# Patient Record
Sex: FEMALE | Race: OTHER | NOT HISPANIC OR LATINO | URBAN - METROPOLITAN AREA
[De-identification: names, ages, dates, MRNs, and addresses within clinical notes are randomized per-mention and may not be internally consistent; named-entity substitution may affect disease eponyms.]

---

## 2018-05-04 ENCOUNTER — EMERGENCY (EMERGENCY)
Facility: HOSPITAL | Age: 48
LOS: 1 days | Discharge: HOME | End: 2018-05-04
Attending: EMERGENCY MEDICINE
Payer: MEDICAID

## 2018-05-04 VITALS
HEIGHT: 66 IN | RESPIRATION RATE: 16 BRPM | SYSTOLIC BLOOD PRESSURE: 107 MMHG | HEART RATE: 101 BPM | TEMPERATURE: 99 F | WEIGHT: 136.03 LBS | DIASTOLIC BLOOD PRESSURE: 70 MMHG | OXYGEN SATURATION: 99 %

## 2018-05-04 VITALS
SYSTOLIC BLOOD PRESSURE: 104 MMHG | RESPIRATION RATE: 16 BRPM | OXYGEN SATURATION: 100 % | DIASTOLIC BLOOD PRESSURE: 52 MMHG | TEMPERATURE: 98 F | HEART RATE: 89 BPM

## 2018-05-04 DIAGNOSIS — Z98.51 TUBAL LIGATION STATUS: Chronic | ICD-10-CM

## 2018-05-04 LAB
ALBUMIN SERPL ELPH-MCNC: 3.4 G/DL — LOW (ref 3.5–5)
ALP SERPL-CCNC: 101 U/L — SIGNIFICANT CHANGE UP (ref 40–120)
ALT FLD-CCNC: 120 U/L DA — HIGH (ref 10–60)
ANION GAP SERPL CALC-SCNC: 6 MMOL/L — SIGNIFICANT CHANGE UP (ref 5–17)
AST SERPL-CCNC: 96 U/L — HIGH (ref 10–40)
BASOPHILS # BLD AUTO: 0.1 K/UL — SIGNIFICANT CHANGE UP (ref 0–0.2)
BASOPHILS NFR BLD AUTO: 0.9 % — SIGNIFICANT CHANGE UP (ref 0–2)
BILIRUB SERPL-MCNC: 0.4 MG/DL — SIGNIFICANT CHANGE UP (ref 0.2–1.2)
BUN SERPL-MCNC: 10 MG/DL — SIGNIFICANT CHANGE UP (ref 7–18)
CALCIUM SERPL-MCNC: 8.6 MG/DL — SIGNIFICANT CHANGE UP (ref 8.4–10.5)
CHLORIDE SERPL-SCNC: 107 MMOL/L — SIGNIFICANT CHANGE UP (ref 96–108)
CO2 SERPL-SCNC: 26 MMOL/L — SIGNIFICANT CHANGE UP (ref 22–31)
CREAT SERPL-MCNC: 0.73 MG/DL — SIGNIFICANT CHANGE UP (ref 0.5–1.3)
EOSINOPHIL # BLD AUTO: 0 K/UL — SIGNIFICANT CHANGE UP (ref 0–0.5)
EOSINOPHIL NFR BLD AUTO: 0.2 % — SIGNIFICANT CHANGE UP (ref 0–6)
GLUCOSE SERPL-MCNC: 89 MG/DL — SIGNIFICANT CHANGE UP (ref 70–99)
HCT VFR BLD CALC: 36.3 % — SIGNIFICANT CHANGE UP (ref 34.5–45)
HGB BLD-MCNC: 11.5 G/DL — SIGNIFICANT CHANGE UP (ref 11.5–15.5)
LACTATE SERPL-SCNC: 0.7 MMOL/L — SIGNIFICANT CHANGE UP (ref 0.7–2)
LYMPHOCYTES # BLD AUTO: 0.7 K/UL — LOW (ref 1–3.3)
LYMPHOCYTES # BLD AUTO: 9.7 % — LOW (ref 13–44)
MCHC RBC-ENTMCNC: 27.2 PG — SIGNIFICANT CHANGE UP (ref 27–34)
MCHC RBC-ENTMCNC: 31.7 GM/DL — LOW (ref 32–36)
MCV RBC AUTO: 85.6 FL — SIGNIFICANT CHANGE UP (ref 80–100)
MONOCYTES # BLD AUTO: 0.7 K/UL — SIGNIFICANT CHANGE UP (ref 0–0.9)
MONOCYTES NFR BLD AUTO: 9.6 % — SIGNIFICANT CHANGE UP (ref 2–14)
NEUTROPHILS # BLD AUTO: 6.1 K/UL — SIGNIFICANT CHANGE UP (ref 1.8–7.4)
NEUTROPHILS NFR BLD AUTO: 79.6 % — HIGH (ref 43–77)
PLATELET # BLD AUTO: 154 K/UL — SIGNIFICANT CHANGE UP (ref 150–400)
POTASSIUM SERPL-MCNC: 3.9 MMOL/L — SIGNIFICANT CHANGE UP (ref 3.5–5.3)
POTASSIUM SERPL-SCNC: 3.9 MMOL/L — SIGNIFICANT CHANGE UP (ref 3.5–5.3)
PROT SERPL-MCNC: 7.2 G/DL — SIGNIFICANT CHANGE UP (ref 6–8.3)
RBC # BLD: 4.24 M/UL — SIGNIFICANT CHANGE UP (ref 3.8–5.2)
RBC # FLD: 12.6 % — SIGNIFICANT CHANGE UP (ref 10.3–14.5)
SODIUM SERPL-SCNC: 139 MMOL/L — SIGNIFICANT CHANGE UP (ref 135–145)
WBC # BLD: 7.6 K/UL — SIGNIFICANT CHANGE UP (ref 3.8–10.5)
WBC # FLD AUTO: 7.6 K/UL — SIGNIFICANT CHANGE UP (ref 3.8–10.5)

## 2018-05-04 PROCEDURE — 71045 X-RAY EXAM CHEST 1 VIEW: CPT | Mod: 26

## 2018-05-04 PROCEDURE — 70450 CT HEAD/BRAIN W/O DYE: CPT | Mod: 26

## 2018-05-04 PROCEDURE — 70491 CT SOFT TISSUE NECK W/DYE: CPT | Mod: 26

## 2018-05-04 PROCEDURE — 99285 EMERGENCY DEPT VISIT HI MDM: CPT | Mod: 25

## 2018-05-04 RX ORDER — SODIUM CHLORIDE 9 MG/ML
1000 INJECTION INTRAMUSCULAR; INTRAVENOUS; SUBCUTANEOUS ONCE
Qty: 0 | Refills: 0 | Status: COMPLETED | OUTPATIENT
Start: 2018-05-04 | End: 2018-05-04

## 2018-05-04 RX ORDER — KETOROLAC TROMETHAMINE 30 MG/ML
30 SYRINGE (ML) INJECTION ONCE
Qty: 0 | Refills: 0 | Status: DISCONTINUED | OUTPATIENT
Start: 2018-05-04 | End: 2018-05-04

## 2018-05-04 RX ORDER — DIPHENHYDRAMINE HCL 50 MG
25 CAPSULE ORAL ONCE
Qty: 0 | Refills: 0 | Status: COMPLETED | OUTPATIENT
Start: 2018-05-04 | End: 2018-05-04

## 2018-05-04 RX ORDER — DEXAMETHASONE 0.5 MG/5ML
5 ELIXIR ORAL ONCE
Qty: 0 | Refills: 0 | Status: COMPLETED | OUTPATIENT
Start: 2018-05-04 | End: 2018-05-04

## 2018-05-04 RX ORDER — METOCLOPRAMIDE HCL 10 MG
10 TABLET ORAL ONCE
Qty: 0 | Refills: 0 | Status: COMPLETED | OUTPATIENT
Start: 2018-05-04 | End: 2018-05-04

## 2018-05-04 RX ADMIN — Medication 5 MILLIGRAM(S): at 21:26

## 2018-05-04 RX ADMIN — Medication 10 MILLIGRAM(S): at 21:26

## 2018-05-04 RX ADMIN — Medication 30 MILLIGRAM(S): at 23:00

## 2018-05-04 RX ADMIN — SODIUM CHLORIDE 1000 MILLILITER(S): 9 INJECTION INTRAMUSCULAR; INTRAVENOUS; SUBCUTANEOUS at 20:56

## 2018-05-04 RX ADMIN — Medication 25 MILLIGRAM(S): at 21:25

## 2018-05-04 NOTE — ED ADULT NURSE REASSESSMENT NOTE - NS ED NURSE REASSESS COMMENT FT1
0269 - pt completed ct scan , looks fairly comfortable no signs of any distress , awaiting for dispo endorse pt to [facility].

## 2018-05-04 NOTE — ED PROVIDER NOTE - PHYSICAL EXAMINATION
b/l submandibular lymphadenopathy  b/l tonsillar edema and erythema  no uvular deviation b/l submandibular lymphadenopathy  b/l tonsillar edema and erythema wihtout exudate, no uvular deviation, no "hot potato" voice

## 2018-05-04 NOTE — ED ADULT NURSE NOTE - OBJECTIVE STATEMENT
pt is a 48 y/o female with multiple complains , states she has headache,cough/bodyaches/ sorethroat ,vomiting,allergies fever and chills . pt alert oriented x 3 with no signs of any distress

## 2018-05-04 NOTE — ED PROVIDER NOTE - OBJECTIVE STATEMENT
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 Pt describes her migraine as the "worst migraine ever" and describes her body sores as if "someone is beating her," and describes her chest discomfort as constant, sharp, pinching that does not change with touch. Pt reports that she feels a sharp pain near her rib cage as well as in her back. Pt also states that she cannot swallow and feels chills as well as feels hot. Pt notes that when she coughs, she feels like her head is throbbing. Pt relates that she cannot bring her chin to her chest and that light bothers her. Pt states that she took Advil (last taken x2.5 hours ago and has been taking every 6 hours) and Zofran, but with no relief. Pt denies sick contacts. Pt denies any other complaints. NKDA. 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 Pt describes her migraine as the "worst migraine ever" and describes her body sores as if "someone is beating her," and describes her chest discomfort as constant, sharp, pinching that does not change with touch. Pt reports that she feels a sharp pain near her rib cage as well as in her back. Pt also states that she cannot swallow and feels chills as well as feels hot. Pt notes that when she coughs, she feels like her head is throbbing. Pt relates that she cannot bring her chin to her chest and that light bothers her. Pt states that she took Advil (last taken x2.5 hours ago and has been taking every 6 hours) and Zofran, but with no relief. Pt denies sick contacts. Pt denies any other complaints. Allergies: penicillin. 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 Pt describes her migraine as "worse than usual" and describes her body aches as if "someone is beating her," and describes her chest discomfort as constant, sharp, pinching that does not change with touch. Pt reports that she feels a sharp pain near her rib cage as well as in her back. Pt also states that she cannot swallow and feels chills as well as feels hot. Pt notes that when she coughs, she feels like her head is throbbing. Pt relates that she cannot bring her chin to her chest and that light bothers her. Pt states that she took Advil (last taken x2.5 hours ago and has been taking every 6 hours) and Zofran, but with no relief. Pt denies sick contacts. Pt denies any other complaints. Allergies: penicillin.

## 2018-05-04 NOTE — ED PROVIDER NOTE - CARDIAC, MLM
Normal rate, regular rhythm.  Heart sounds S1, S2.  No murmurs, rubs or gallops. FAST rate, regular rhythm.  Heart sounds S1, S2.  No murmurs, rubs or gallops.

## 2018-05-04 NOTE — ED PROVIDER NOTE - CHPI ED SYMPTOMS POS
COUGH/sore throat, vomiting, fever, chills, nausea, migraine HA, chest pain, body sores, photophobia

## 2018-05-04 NOTE — ED PROVIDER NOTE - THROAT FINDINGS
NO VESICLES/ULCERS/NO DROOLING/NO TONGUE ELEVATION/NO STRIDOR/no exudate/uvula midline/TONSILLAR SWELLING/THROAT RED

## 2018-05-04 NOTE — ED PROVIDER NOTE - NS ED ROS FT
CONSTITUTIONAL: fever, chills  THROAT: sore throat   NEURO: migraine HAs  GASTROINTESTINAL: nausea, vomiting   MUSCULOSKELETAL: body sores   RESPIRATORY: cough  EYES: photophobia   CARDIAC: chest pain CONSTITUTIONAL: fever, chills  THROAT: sore throat, difficulty swallowing   NEURO: migraine HAs  GASTROINTESTINAL: nausea, vomiting   MUSCULOSKELETAL: myalgias, arthralgias  RESPIRATORY: cough, pleuritic chest pain  EYES: photophobia   CARDIAC: chest wall pain

## 2018-05-04 NOTE — ED PROVIDER NOTE - MEDICAL DECISION MAKING DETAILS
48 yo fem with fever, chills, myalgias, arthralgias, pharyngitis with lymphadenopathy and difficulty swallowing, along with cough and chest wall pain. pt also with migraine headache. will eval labs, cxr, CT head and soft tissue neck to evaluate for etiology of fever and discomfort, differentials include viral illness, pharyngitis, PTA/RPA, migraine, and pna.

## 2018-05-04 NOTE — ED PROVIDER NOTE - ATTENDING CONTRIBUTION TO CARE
47 year old female c/o sore throat and malaise x few days. PE: NAD, HEENT WNL, lungs clear. I&P: viral syndrome

## 2018-05-05 PROCEDURE — 87040 BLOOD CULTURE FOR BACTERIA: CPT

## 2018-05-05 PROCEDURE — 71045 X-RAY EXAM CHEST 1 VIEW: CPT

## 2018-05-05 PROCEDURE — 84702 CHORIONIC GONADOTROPIN TEST: CPT

## 2018-05-05 PROCEDURE — 96374 THER/PROPH/DIAG INJ IV PUSH: CPT | Mod: XU

## 2018-05-05 PROCEDURE — 70491 CT SOFT TISSUE NECK W/DYE: CPT

## 2018-05-05 PROCEDURE — 99284 EMERGENCY DEPT VISIT MOD MDM: CPT | Mod: 25

## 2018-05-05 PROCEDURE — 96375 TX/PRO/DX INJ NEW DRUG ADDON: CPT

## 2018-05-05 PROCEDURE — 83605 ASSAY OF LACTIC ACID: CPT

## 2018-05-05 PROCEDURE — 85027 COMPLETE CBC AUTOMATED: CPT

## 2018-05-05 PROCEDURE — 80053 COMPREHEN METABOLIC PANEL: CPT

## 2018-05-05 PROCEDURE — 70450 CT HEAD/BRAIN W/O DYE: CPT

## 2018-05-05 RX ORDER — IBUPROFEN 200 MG
1 TABLET ORAL
Qty: 30 | Refills: 0
Start: 2018-05-05 | End: 2018-05-14

## 2018-05-05 RX ORDER — AZITHROMYCIN 500 MG/1
500 TABLET, FILM COATED ORAL ONCE
Qty: 0 | Refills: 0 | Status: COMPLETED | OUTPATIENT
Start: 2018-05-05 | End: 2018-05-05

## 2018-05-05 RX ORDER — CLARITHROMYCIN 500 MG
1 TABLET ORAL
Qty: 4 | Refills: 0
Start: 2018-05-05 | End: 2018-05-08

## 2018-05-05 RX ADMIN — Medication 2 TABLET(S): at 01:20

## 2018-05-05 RX ADMIN — Medication 2 TABLET(S): at 00:31

## 2018-05-05 RX ADMIN — Medication 30 MILLIGRAM(S): at 01:20

## 2018-05-05 RX ADMIN — AZITHROMYCIN 500 MILLIGRAM(S): 500 TABLET, FILM COATED ORAL at 01:28

## 2018-05-10 LAB
CULTURE RESULTS: SIGNIFICANT CHANGE UP
CULTURE RESULTS: SIGNIFICANT CHANGE UP
SPECIMEN SOURCE: SIGNIFICANT CHANGE UP
SPECIMEN SOURCE: SIGNIFICANT CHANGE UP

## 2020-01-23 ENCOUNTER — EMERGENCY (EMERGENCY)
Facility: HOSPITAL | Age: 50
LOS: 1 days | Discharge: HOME | End: 2020-01-23
Attending: EMERGENCY MEDICINE
Payer: MEDICAID

## 2020-01-23 VITALS
SYSTOLIC BLOOD PRESSURE: 141 MMHG | HEART RATE: 122 BPM | TEMPERATURE: 98 F | OXYGEN SATURATION: 100 % | HEIGHT: 66 IN | WEIGHT: 138.01 LBS | DIASTOLIC BLOOD PRESSURE: 90 MMHG | RESPIRATION RATE: 20 BRPM

## 2020-01-23 DIAGNOSIS — Z98.51 TUBAL LIGATION STATUS: Chronic | ICD-10-CM

## 2020-01-23 PROCEDURE — 99284 EMERGENCY DEPT VISIT MOD MDM: CPT

## 2020-01-24 VITALS
SYSTOLIC BLOOD PRESSURE: 110 MMHG | RESPIRATION RATE: 18 BRPM | TEMPERATURE: 98 F | OXYGEN SATURATION: 99 % | HEART RATE: 65 BPM | DIASTOLIC BLOOD PRESSURE: 68 MMHG

## 2020-01-24 LAB
ALBUMIN SERPL ELPH-MCNC: 4.4 G/DL — SIGNIFICANT CHANGE UP (ref 3.3–5)
ALP SERPL-CCNC: 108 U/L — SIGNIFICANT CHANGE UP (ref 40–120)
ALT FLD-CCNC: 34 U/L — SIGNIFICANT CHANGE UP (ref 10–45)
ANION GAP SERPL CALC-SCNC: 12 MMOL/L — SIGNIFICANT CHANGE UP (ref 5–17)
AST SERPL-CCNC: 25 U/L — SIGNIFICANT CHANGE UP (ref 10–40)
BASOPHILS # BLD AUTO: 0.04 K/UL — SIGNIFICANT CHANGE UP (ref 0–0.2)
BASOPHILS NFR BLD AUTO: 0.9 % — SIGNIFICANT CHANGE UP (ref 0–2)
BILIRUB SERPL-MCNC: 0.4 MG/DL — SIGNIFICANT CHANGE UP (ref 0.2–1.2)
BUN SERPL-MCNC: 10 MG/DL — SIGNIFICANT CHANGE UP (ref 7–23)
CALCIUM SERPL-MCNC: 9.9 MG/DL — SIGNIFICANT CHANGE UP (ref 8.4–10.5)
CHLORIDE SERPL-SCNC: 100 MMOL/L — SIGNIFICANT CHANGE UP (ref 96–108)
CO2 SERPL-SCNC: 25 MMOL/L — SIGNIFICANT CHANGE UP (ref 22–31)
CREAT SERPL-MCNC: 0.79 MG/DL — SIGNIFICANT CHANGE UP (ref 0.5–1.3)
EOSINOPHIL # BLD AUTO: 0.02 K/UL — SIGNIFICANT CHANGE UP (ref 0–0.5)
EOSINOPHIL NFR BLD AUTO: 0.5 % — SIGNIFICANT CHANGE UP (ref 0–6)
GLUCOSE SERPL-MCNC: 102 MG/DL — HIGH (ref 70–99)
HCG SERPL-ACNC: <2 MIU/ML — SIGNIFICANT CHANGE UP
HCT VFR BLD CALC: 38.9 % — SIGNIFICANT CHANGE UP (ref 34.5–45)
HGB BLD-MCNC: 12.3 G/DL — SIGNIFICANT CHANGE UP (ref 11.5–15.5)
IMM GRANULOCYTES NFR BLD AUTO: 0.5 % — SIGNIFICANT CHANGE UP (ref 0–1.5)
LYMPHOCYTES # BLD AUTO: 1.37 K/UL — SIGNIFICANT CHANGE UP (ref 1–3.3)
LYMPHOCYTES # BLD AUTO: 31.1 % — SIGNIFICANT CHANGE UP (ref 13–44)
MCHC RBC-ENTMCNC: 26.9 PG — LOW (ref 27–34)
MCHC RBC-ENTMCNC: 31.6 GM/DL — LOW (ref 32–36)
MCV RBC AUTO: 84.9 FL — SIGNIFICANT CHANGE UP (ref 80–100)
MONOCYTES # BLD AUTO: 0.41 K/UL — SIGNIFICANT CHANGE UP (ref 0–0.9)
MONOCYTES NFR BLD AUTO: 9.3 % — SIGNIFICANT CHANGE UP (ref 2–14)
NEUTROPHILS # BLD AUTO: 2.54 K/UL — SIGNIFICANT CHANGE UP (ref 1.8–7.4)
NEUTROPHILS NFR BLD AUTO: 57.7 % — SIGNIFICANT CHANGE UP (ref 43–77)
NRBC # BLD: 0 /100 WBCS — SIGNIFICANT CHANGE UP (ref 0–0)
PLATELET # BLD AUTO: 214 K/UL — SIGNIFICANT CHANGE UP (ref 150–400)
POTASSIUM SERPL-MCNC: 3.9 MMOL/L — SIGNIFICANT CHANGE UP (ref 3.5–5.3)
POTASSIUM SERPL-SCNC: 3.9 MMOL/L — SIGNIFICANT CHANGE UP (ref 3.5–5.3)
PROT SERPL-MCNC: 7.6 G/DL — SIGNIFICANT CHANGE UP (ref 6–8.3)
RBC # BLD: 4.58 M/UL — SIGNIFICANT CHANGE UP (ref 3.8–5.2)
RBC # FLD: 13.1 % — SIGNIFICANT CHANGE UP (ref 10.3–14.5)
SODIUM SERPL-SCNC: 137 MMOL/L — SIGNIFICANT CHANGE UP (ref 135–145)
WBC # BLD: 4.4 K/UL — SIGNIFICANT CHANGE UP (ref 3.8–10.5)
WBC # FLD AUTO: 4.4 K/UL — SIGNIFICANT CHANGE UP (ref 3.8–10.5)

## 2020-01-24 PROCEDURE — 70450 CT HEAD/BRAIN W/O DYE: CPT | Mod: 26

## 2020-01-24 RX ORDER — SODIUM CHLORIDE 9 MG/ML
1000 INJECTION, SOLUTION INTRAVENOUS ONCE
Refills: 0 | Status: COMPLETED | OUTPATIENT
Start: 2020-01-24 | End: 2020-01-24

## 2020-01-24 RX ORDER — ACETAMINOPHEN 500 MG
650 TABLET ORAL ONCE
Refills: 0 | Status: COMPLETED | OUTPATIENT
Start: 2020-01-24 | End: 2020-01-24

## 2020-01-24 RX ORDER — MAGNESIUM SULFATE 500 MG/ML
1 VIAL (ML) INJECTION ONCE
Refills: 0 | Status: COMPLETED | OUTPATIENT
Start: 2020-01-24 | End: 2020-01-24

## 2020-01-24 RX ORDER — ACETAMINOPHEN 500 MG
1000 TABLET ORAL ONCE
Refills: 0 | Status: DISCONTINUED | OUTPATIENT
Start: 2020-01-24 | End: 2020-01-24

## 2020-01-24 RX ORDER — SODIUM CHLORIDE 9 MG/ML
1000 INJECTION INTRAMUSCULAR; INTRAVENOUS; SUBCUTANEOUS ONCE
Refills: 0 | Status: DISCONTINUED | OUTPATIENT
Start: 2020-01-24 | End: 2020-01-24

## 2020-01-24 RX ORDER — DEXAMETHASONE 0.5 MG/5ML
10 ELIXIR ORAL ONCE
Refills: 0 | Status: DISCONTINUED | OUTPATIENT
Start: 2020-01-24 | End: 2020-01-24

## 2020-01-24 RX ORDER — METOCLOPRAMIDE HCL 10 MG
10 TABLET ORAL ONCE
Refills: 0 | Status: COMPLETED | OUTPATIENT
Start: 2020-01-24 | End: 2020-01-24

## 2020-01-24 RX ORDER — DEXAMETHASONE 0.5 MG/5ML
10 ELIXIR ORAL ONCE
Refills: 0 | Status: COMPLETED | OUTPATIENT
Start: 2020-01-24 | End: 2020-01-24

## 2020-01-24 RX ORDER — KETOROLAC TROMETHAMINE 30 MG/ML
15 SYRINGE (ML) INJECTION ONCE
Refills: 0 | Status: DISCONTINUED | OUTPATIENT
Start: 2020-01-24 | End: 2020-01-24

## 2020-01-24 RX ADMIN — Medication 10 MILLIGRAM(S): at 00:53

## 2020-01-24 RX ADMIN — Medication 102 MILLIGRAM(S): at 02:36

## 2020-01-24 RX ADMIN — SODIUM CHLORIDE 1000 MILLILITER(S): 9 INJECTION, SOLUTION INTRAVENOUS at 00:54

## 2020-01-24 RX ADMIN — Medication 650 MILLIGRAM(S): at 00:54

## 2020-01-24 RX ADMIN — Medication 15 MILLIGRAM(S): at 03:50

## 2020-01-24 RX ADMIN — Medication 100 GRAM(S): at 00:54

## 2020-01-24 NOTE — ED PROVIDER NOTE - NSFOLLOWUPINSTRUCTIONS_ED_ALL_ED_FT
(1) Follow up with your primary care physician as discussed.   (2) Immediately seek care at your nearest emergency room if your worsen, persist, or do not resolve   (3) Take Tylenol (up to 1000mg or 1 g)  and/or Motrin (up to 600mg) up to every 6 hours as needed for pain.

## 2020-01-24 NOTE — ED PROVIDER NOTE - NSFOLLOWUPCLINICS_GEN_ALL_ED_FT
[facility]  Neurology  [address] [place] [address] [address] [place] [address]  Phone: [phone]  Fax:   Follow Up Time: Routine

## 2020-01-24 NOTE — ED PROVIDER NOTE - PATIENT PORTAL LINK FT
You can access the FollowMyHealth Patient Portal offered by [facility] by registering at the following website: [email or web address]. By joining [facility]’s FollowMyHealth portal, you will also be able to view your health information using other applications (apps) compatible with our system.

## 2020-01-24 NOTE — ED ADULT NURSE NOTE - OBJECTIVE STATEMENT
49 year old Female, comes to ED with nausea and headache. X2 episodes of vomiting, patient took Excedrin with no relief. Sensitive to light. This headache is on the left side, does not feel like all other previous headaches. A&Ox3, breathing spontaneous and unlabored, sinus rhythm on cardiac monitor, palpable pulses, skin normal for ethnicity. Denies chest pain, shortness of breath, dizziness, vision changes. Bed locked and in lowest position with side rails up for safety.

## 2020-01-24 NOTE — ED ADULT NURSE REASSESSMENT NOTE - NS ED NURSE REASSESS COMMENT FT1
Patient asleep in bed at this time. No signs of distress noted, sinus rhythm on cardiac monitor. Bed locked and in lowest position with side rails up for safety.

## 2020-01-24 NOTE — ED PROVIDER NOTE - PHYSICAL EXAMINATION
General: NAD  HEENT: pupils equal and reactive, normal external ears bilaterally   Cardiac: RRR, no MRG appreciated  Resp: lungs clear to auscultation bilaterally, symmetric chest wall rise  Abd: soft, nontender, nondistended,   : no CVA tenderness  Neuro: Moving all extremities, cn 2-12 intact, no gait abnormalities , kernigs negative, brudzinskis negative, no neck stiffness   Skin:  normal color for race

## 2020-01-24 NOTE — ED PROVIDER NOTE - PROGRESS NOTE DETAILS
Rg PGY-2:  Headache much improved, D/W pt benefits/risks of LP at this time as CT was performed >6 hours from initial pain, states does not want LP at this time, will give decadron and reassess for improvement in headache Rg PGY-2:  Headache much improved, D/W pt benefits/risks of LP at this time as CT was performed >6 hours from initial pain, states does not want LP at this time and understands benefits/risks, will give decadron and reassess for improvement in headache Rg PGY-2:  Headache resolved, pt clinically stable, I have given the pt strict return and follow up precautions. The patient has been provided with a copy of all pertinent results. The patient has been informed of all concerning signs and symptoms to return to Emergency Department, the necessity to follow up with PMD/Clinic/follow up provided within 2-3 days was explained, and the patient reports understanding of above with capacity and insight.

## 2020-01-24 NOTE — ED PROVIDER NOTE - TOBACCO USE
Date of Service: 02/27/2018    CHIEF COMPLAINT:  Abdominal bloating associated with cramping and excessive gas.    HISTORY OF PRESENT ILLNESS:  The patient is a 73-year-old female who comes in today with a week long history of abdominal complaints.  She reports that she has had excessive belching and passing of significant amounts of gas as well as bloating for the past week.  Typically this is postprandial in nature.  She admits that she eats a lot of fast foods and her share of ice cream.  She has also had loose bowels.  She denies any nausea or vomiting.  There have been no fevers, sweats or chills.  She has taken nothing new in her diet.    She states that she has a history of GERD and does take Ranitidine but only on an as-needed basis.  She drinks caffeinated beverages in terms of tea or soda.  She is a nonsmoker.  She does not drink alcohol.  She only takes an occasional Ibuprofen for neck pain.    She has had no intra-abdominal surgery.    FAMILY HISTORY:   Reveals that her father had gallstone problems.    PHYSICAL EXAMINATION:  VITAL SIGNS:  Blood pressure 132/60, pulse 60 and regular, respirations 16, temperature 98.2.  The patient is 5 feet 1-1/2 inches tall, weighs 60.2 kilograms, O2 saturation 98%, body mass index 24.67.  SKIN:  Without rash, ecchymosis or petechiae.  LYMPH NODES:  No palpable cervical, clavicular, axillary or inguinal adenopathy.  HEENT:  Head is normocephalic without trauma.  Eyes:  Pupils are midposition, equally round, reactive to light and accommodation.  Extraocular movements are full.  Funduscopic exam is negative.  Ears:  TMs are clear.  Nose:  No septal deviation or purulent discharge.  Throat:  The posterior oropharynx is clear, without hemorrhage or exudate.  NECK:  Supple, without mass, thyromegaly, or obvious bruit.  LUNGS:  Chest fields are clear.  There are no rales, rhonchi, rubs or wheezes.  CARDIAC:  Regular rhythm without murmur, gallop or rub.  ABDOMEN:  Soft,  normoactive bowel sounds.  No palpable masses or localized tenderness.  No CVA tenderness.  BACK:  Without CVA or spinal tenderness.  EXTREMITIES:  No edema, cyanosis, pallor or clubbing.  NEUROLOGIC:  Without focal or lateralizing deficits.    MEDICAL DECISION MAKING:  Differential diagnosis was reviewed with the patient.  First ideas would include that of gallbladder disease including cholelithiasis or a poorly functioning gallbladder and we will check this with ultrasound.  We also talked about irritable bowel syndrome as well as possible lactose intolerance as well.  She does have a known history of diverticular disease of the colon.  Last colonoscopy was performed in 10/2016.    PLAN:  Will obtain gallbladder ultrasound.  I will call with results.  Discussed low-fat diet.  Further workup and recommendations regarding treatment following her ultrasound.      Dictated By: Yann Pardo MD  Signing Provider: Yann Pardo MD    GG/ ([id number])  DD: 02/27/2018 12:24:36 TD: 02/28/2018 09:59:48    Copy Sent To:    Never smoker

## 2020-01-24 NOTE — ED ADULT NURSE NOTE - NSIMPLEMENTINTERV_GEN_ALL_ED
Implemented All Universal Safety Interventions:  [place] to call system. Call bell, personal items and telephone within reach. Instruct patient to call for assistance. Room bathroom lighting operational. Non-slip footwear when patient is off stretcher. Physically safe environment: no spills, clutter or unnecessary equipment. Stretcher in lowest position, wheels locked, appropriate side rails in place.

## 2020-01-24 NOTE — ED PROVIDER NOTE - OBJECTIVE STATEMENT
50yo no pmhx pw cc of headache    Headache 4am maximal in onset at that time followed by 1x episode of vomit, took Excedrin which did not help. Vomited again at 630pm after eating. Headache is the same, pulsating, unilateral over L side of head. Light bothers pt. No fevers.  Has headaches does not feel like other headaches. No head trauma. CT head last 4 years ago.     Denies substance use/cocaine/alcohol/smoking    No meds  Allergies: Penicillin - RAshes

## 2020-01-24 NOTE — ED PROVIDER NOTE - NS ED ROS FT
CONSTITUTIONAL: No fevers, no chills  Cardiovascular: No Chest pain  Respiratory: No SOB  Gastrointestinal: Nausea   SKIN: no rashes.  NEURO: refer to HPI  PSYCHIATRIC: no known mental health issues.

## 2020-05-17 NOTE — ED PROVIDER NOTE - ATTENDING CONTRIBUTION TO CARE
Afebrile. Awake and Alert. Lungs CTA. Heart RRR. Abdomen soft NTND. Neurologic exam: A&O x3, speech clear, ASH, CN II-XII intact, motor strength +5/5 in all extremities, sensation equal bilaterally, finger-to-nose normal, gait steady. FALKOWSKA.    r/o migraine  r/o ICH given sudden onset, however, within 24 hrs of onset calm and cooperative  ICU Vital Signs Last 24 Hrs  T(C): 37.3 (17 May 2020 20:57), Max: 37.3 (17 May 2020 20:57)  T(F): 99.1 (17 May 2020 20:57), Max: 99.1 (17 May 2020 20:57)  HR: 72 (17 May 2020 21:30) (72 - 100)  BP: 112/67 (17 May 2020 20:57) (112/67 - 112/67)  BP(mean): --  ABP: --  ABP(mean): --  RR: 14 (17 May 2020 21:30) (14 - 18)  SpO2: 100% (17 May 2020 20:57) (100% - 100%) Afebrile. Awake and Alert. Lungs CTA. Heart RRR. Abdomen soft NTND. Neurologic exam: A&O x3, speech clear, ASH, CN II-XII intact, motor strength +5/5 in all extremities, sensation equal bilaterally, finger-to-nose normal, gait steady. FALKOWSKA.    No focal neuro deficit  r/o migraine  r/o SAH given sudden onset, however, within 24 hrs of onset  Neuro f/u none reported has friends, denies any bullying or gang activity

## 2020-11-03 ENCOUNTER — EMERGENCY (EMERGENCY)
Facility: HOSPITAL | Age: 50
LOS: 1 days | Discharge: HOME | End: 2020-11-03
Attending: EMERGENCY MEDICINE
Payer: MEDICAID

## 2020-11-03 VITALS
SYSTOLIC BLOOD PRESSURE: 122 MMHG | DIASTOLIC BLOOD PRESSURE: 82 MMHG | RESPIRATION RATE: 22 BRPM | WEIGHT: 139.99 LBS | OXYGEN SATURATION: 99 % | TEMPERATURE: 98 F | HEART RATE: 84 BPM | HEIGHT: 66 IN

## 2020-11-03 DIAGNOSIS — Z98.51 TUBAL LIGATION STATUS: Chronic | ICD-10-CM

## 2020-11-03 LAB
ALBUMIN SERPL ELPH-MCNC: 4.5 G/DL — SIGNIFICANT CHANGE UP (ref 3.3–5)
ALP SERPL-CCNC: 119 U/L — SIGNIFICANT CHANGE UP (ref 40–120)
ALT FLD-CCNC: 47 U/L — HIGH (ref 10–45)
ANION GAP SERPL CALC-SCNC: 8 MMOL/L — SIGNIFICANT CHANGE UP (ref 5–17)
AST SERPL-CCNC: 35 U/L — SIGNIFICANT CHANGE UP (ref 10–40)
BASE EXCESS BLDV CALC-SCNC: 1.7 MMOL/L — SIGNIFICANT CHANGE UP (ref -2–2)
BASOPHILS # BLD AUTO: 0.02 K/UL — SIGNIFICANT CHANGE UP (ref 0–0.2)
BASOPHILS NFR BLD AUTO: 0.4 % — SIGNIFICANT CHANGE UP (ref 0–2)
BILIRUB SERPL-MCNC: 0.4 MG/DL — SIGNIFICANT CHANGE UP (ref 0.2–1.2)
BUN SERPL-MCNC: 9 MG/DL — SIGNIFICANT CHANGE UP (ref 7–23)
CA-I SERPL-SCNC: 1.27 MMOL/L — SIGNIFICANT CHANGE UP (ref 1.12–1.3)
CALCIUM SERPL-MCNC: 9.9 MG/DL — SIGNIFICANT CHANGE UP (ref 8.4–10.5)
CHLORIDE BLDV-SCNC: 104 MMOL/L — SIGNIFICANT CHANGE UP (ref 96–108)
CHLORIDE SERPL-SCNC: 102 MMOL/L — SIGNIFICANT CHANGE UP (ref 96–108)
CO2 BLDV-SCNC: 30 MMOL/L — SIGNIFICANT CHANGE UP (ref 22–30)
CO2 SERPL-SCNC: 24 MMOL/L — SIGNIFICANT CHANGE UP (ref 22–31)
CREAT SERPL-MCNC: 0.66 MG/DL — SIGNIFICANT CHANGE UP (ref 0.5–1.3)
EOSINOPHIL # BLD AUTO: 0 K/UL — SIGNIFICANT CHANGE UP (ref 0–0.5)
EOSINOPHIL NFR BLD AUTO: 0 % — SIGNIFICANT CHANGE UP (ref 0–6)
GAS PNL BLDV: 139 MMOL/L — SIGNIFICANT CHANGE UP (ref 135–145)
GAS PNL BLDV: SIGNIFICANT CHANGE UP
GAS PNL BLDV: SIGNIFICANT CHANGE UP
GLUCOSE BLDV-MCNC: 128 MG/DL — HIGH (ref 70–99)
GLUCOSE SERPL-MCNC: 127 MG/DL — HIGH (ref 70–99)
HCO3 BLDV-SCNC: 28 MMOL/L — SIGNIFICANT CHANGE UP (ref 21–29)
HCT VFR BLD CALC: 38.5 % — SIGNIFICANT CHANGE UP (ref 34.5–45)
HCT VFR BLDA CALC: 38 % — LOW (ref 39–50)
HGB BLD CALC-MCNC: 12.4 G/DL — SIGNIFICANT CHANGE UP (ref 11.5–15.5)
HGB BLD-MCNC: 12.1 G/DL — SIGNIFICANT CHANGE UP (ref 11.5–15.5)
HIV 1 & 2 AB SERPL IA.RAPID: SIGNIFICANT CHANGE UP
IMM GRANULOCYTES NFR BLD AUTO: 0.4 % — SIGNIFICANT CHANGE UP (ref 0–1.5)
LACTATE BLDV-MCNC: 1 MMOL/L — SIGNIFICANT CHANGE UP (ref 0.7–2)
LYMPHOCYTES # BLD AUTO: 0.52 K/UL — LOW (ref 1–3.3)
LYMPHOCYTES # BLD AUTO: 9.4 % — LOW (ref 13–44)
MAGNESIUM SERPL-MCNC: 1.9 MG/DL — SIGNIFICANT CHANGE UP (ref 1.6–2.6)
MCHC RBC-ENTMCNC: 27.1 PG — SIGNIFICANT CHANGE UP (ref 27–34)
MCHC RBC-ENTMCNC: 31.4 GM/DL — LOW (ref 32–36)
MCV RBC AUTO: 86.1 FL — SIGNIFICANT CHANGE UP (ref 80–100)
MONOCYTES # BLD AUTO: 0.1 K/UL — SIGNIFICANT CHANGE UP (ref 0–0.9)
MONOCYTES NFR BLD AUTO: 1.8 % — LOW (ref 2–14)
NEUTROPHILS # BLD AUTO: 4.85 K/UL — SIGNIFICANT CHANGE UP (ref 1.8–7.4)
NEUTROPHILS NFR BLD AUTO: 88 % — HIGH (ref 43–77)
NRBC # BLD: 0 /100 WBCS — SIGNIFICANT CHANGE UP (ref 0–0)
PCO2 BLDV: 56 MMHG — HIGH (ref 35–50)
PH BLDV: 7.32 — LOW (ref 7.35–7.45)
PLATELET # BLD AUTO: 192 K/UL — SIGNIFICANT CHANGE UP (ref 150–400)
PO2 BLDV: 29 MMHG — SIGNIFICANT CHANGE UP (ref 25–45)
POTASSIUM BLDV-SCNC: 3.6 MMOL/L — SIGNIFICANT CHANGE UP (ref 3.5–5.3)
POTASSIUM SERPL-MCNC: 3.6 MMOL/L — SIGNIFICANT CHANGE UP (ref 3.5–5.3)
POTASSIUM SERPL-SCNC: 3.6 MMOL/L — SIGNIFICANT CHANGE UP (ref 3.5–5.3)
PROT SERPL-MCNC: 7.2 G/DL — SIGNIFICANT CHANGE UP (ref 6–8.3)
RBC # BLD: 4.47 M/UL — SIGNIFICANT CHANGE UP (ref 3.8–5.2)
RBC # FLD: 13.2 % — SIGNIFICANT CHANGE UP (ref 10.3–14.5)
SAO2 % BLDV: 44 % — LOW (ref 67–88)
SODIUM SERPL-SCNC: 134 MMOL/L — LOW (ref 135–145)
WBC # BLD: 5.51 K/UL — SIGNIFICANT CHANGE UP (ref 3.8–10.5)
WBC # FLD AUTO: 5.51 K/UL — SIGNIFICANT CHANGE UP (ref 3.8–10.5)

## 2020-11-03 PROCEDURE — 99285 EMERGENCY DEPT VISIT HI MDM: CPT

## 2020-11-03 RX ORDER — PROCHLORPERAZINE MALEATE 5 MG
10 TABLET ORAL ONCE
Refills: 0 | Status: COMPLETED | OUTPATIENT
Start: 2020-11-03 | End: 2020-11-03

## 2020-11-03 RX ORDER — MAGNESIUM SULFATE 500 MG/ML
2 VIAL (ML) INJECTION ONCE
Refills: 0 | Status: COMPLETED | OUTPATIENT
Start: 2020-11-03 | End: 2020-11-03

## 2020-11-03 RX ORDER — SODIUM CHLORIDE 9 MG/ML
1000 INJECTION, SOLUTION INTRAVENOUS ONCE
Refills: 0 | Status: DISCONTINUED | OUTPATIENT
Start: 2020-11-03 | End: 2020-11-03

## 2020-11-03 RX ORDER — ACETAMINOPHEN 500 MG
1000 TABLET ORAL ONCE
Refills: 0 | Status: COMPLETED | OUTPATIENT
Start: 2020-11-03 | End: 2020-11-03

## 2020-11-03 RX ORDER — DIPHENHYDRAMINE HCL 50 MG
50 CAPSULE ORAL ONCE
Refills: 0 | Status: COMPLETED | OUTPATIENT
Start: 2020-11-03 | End: 2020-11-03

## 2020-11-03 RX ORDER — ONDANSETRON 8 MG/1
4 TABLET, FILM COATED ORAL ONCE
Refills: 0 | Status: COMPLETED | OUTPATIENT
Start: 2020-11-03 | End: 2020-11-03

## 2020-11-03 RX ORDER — KETOROLAC TROMETHAMINE 30 MG/ML
15 SYRINGE (ML) INJECTION ONCE
Refills: 0 | Status: DISCONTINUED | OUTPATIENT
Start: 2020-11-03 | End: 2020-11-03

## 2020-11-03 RX ORDER — SODIUM CHLORIDE 9 MG/ML
1000 INJECTION INTRAMUSCULAR; INTRAVENOUS; SUBCUTANEOUS ONCE
Refills: 0 | Status: COMPLETED | OUTPATIENT
Start: 2020-11-03 | End: 2020-11-03

## 2020-11-03 RX ORDER — DIPHENHYDRAMINE HCL 50 MG
50 CAPSULE ORAL ONCE
Refills: 0 | Status: DISCONTINUED | OUTPATIENT
Start: 2020-11-03 | End: 2020-11-03

## 2020-11-03 RX ADMIN — Medication 15 MILLIGRAM(S): at 23:12

## 2020-11-03 RX ADMIN — Medication 15 MILLIGRAM(S): at 22:41

## 2020-11-03 RX ADMIN — Medication 15 MILLIGRAM(S): at 23:06

## 2020-11-03 RX ADMIN — Medication 400 MILLIGRAM(S): at 23:07

## 2020-11-03 RX ADMIN — Medication 15 MILLIGRAM(S): at 21:30

## 2020-11-03 RX ADMIN — Medication 10 MILLIGRAM(S): at 22:14

## 2020-11-03 RX ADMIN — Medication 50 GRAM(S): at 23:44

## 2020-11-03 RX ADMIN — ONDANSETRON 4 MILLIGRAM(S): 8 TABLET, FILM COATED ORAL at 21:09

## 2020-11-03 RX ADMIN — SODIUM CHLORIDE 1000 MILLILITER(S): 9 INJECTION INTRAMUSCULAR; INTRAVENOUS; SUBCUTANEOUS at 21:08

## 2020-11-03 RX ADMIN — Medication 50 MILLIGRAM(S): at 21:09

## 2020-11-03 NOTE — ED PROVIDER NOTE - PROGRESS NOTE DETAILS
Endorsed to Dr YAEL Maciel MD, Facep Dr. Vogt's note: At the beginning of my shift, I took over care of the patient from outgoing physician with plan to re-evaluate pt after medication and f/u for any further neuro recs. per neuro, pt ok to go if symptoms improve vs mri if symptoms unable to be controlled. ON re-eval, pt sleeping comfortably. upon awakening pt ambulated without difficulty, with pain fully resolved. pt did note that subsequently pain came back mildly on R temple. pt feels comfortable going home. pt to f/u with neuro for further outpt care.

## 2020-11-03 NOTE — ED PROVIDER NOTE - RAPID ASSESSMENT
49y F with PMHx of Migraines presents to the ED c/o throbbing HA radiating down to the B/L neck with vomiting. Has been taking Excedrin for the past few months for her chronic migraines. Did not take anything today 2/2 vomiting. Denies fevers, cough.    Andrea Giles MD note: The scribe's documentation has been prepared under my direction and personally reviewed by me.  Patient was seen as a tele QDOC patient, a physical exam was not performed as there is no physical exam room available the patient will be seen and further worked up in the main emergency department and their care will be completed by the main emergency department team. Receiving team will follow up on labs, analgesia, any clinical imaging, reassess and disposition as clinically indicated, all decisions regarding the progression of care will be made at their discretion. 49y F with PMHx of Migraines presents to the ED c/o throbbing HA radiating down to the B/L neck with vomiting. Has been taking Excedrin regularly for the past 5 months for her chronic migraines, but abruptly stopped taking it today. Did not take anything today 2/2 vomiting. Denies fevers, cough.    Andrea Giles MD note: The scribe's documentation has been prepared under my direction and personally reviewed by me.  Patient was seen as a tele QDOC patient, a physical exam was not performed as there is no physical exam room available the patient will be seen and further worked up in the main emergency department and their care will be completed by the main emergency department team. Receiving team will follow up on labs, analgesia, any clinical imaging, reassess and disposition as clinically indicated, all decisions regarding the progression of care will be made at their discretion. 49y F with PMHx of Migraines presents to the ED c/o throbbing HA radiating down to the B/L neck with vomiting. Has been taking Excedrin regularly for the past 5 months for her chronic migraines, but abruptly stopped taking it today. Did not take anything today 2/2 vomiting. Pt agreed to have a HIV test. Denies fevers, cough.    Andrea Giles MD note: The scribe's documentation has been prepared under my direction and personally reviewed by me.  Patient was seen as a tele QDOC patient, a physical exam was not performed as there is no physical exam room available the patient will be seen and further worked up in the main emergency department and their care will be completed by the main emergency department team. Receiving team will follow up on labs, analgesia, any clinical imaging, reassess and disposition as clinically indicated, all decisions regarding the progression of care will be made at their discretion.

## 2020-11-03 NOTE — ED PROVIDER NOTE - CLINICAL SUMMARY MEDICAL DECISION MAKING FREE TEXT BOX
migraine ha pw pain,nv, not worst ha of life, No fever chills  trauma, IVF.zofran, tylenol,toradol and reassess  Andrea Maciel MD, Facep

## 2020-11-03 NOTE — ED PROVIDER NOTE - OBJECTIVE STATEMENT
49y F pmhx migraine HAs presents to ED complaint of diffuse HA, nausea, and vomiting since this morning. Pt reports WALTER is typical for her migraines, starts unilateral frontal/facial then b/l throughout head radiating to neck. Pt comes to ED due to intractable vomiting. Takes Excedrin daily for last 6 months. Neck trigger point injections at pain management. Never seen neurologist. Denies fever, chills, cp, SOB, abd pain, diarrhea, unusual foods (had soup last night, family shared meal, no others ill.) 49y F pmhx migraine HAs presents to ED complaint of diffuse HA, nausea, and vomiting since this morning. Pt reports WALTER is typical for her migraines, starts unilateral frontal/facial then b/l throughout head radiating to neck. Pt comes to ED due to intractable vomiting. Takes Excedrin daily for last 6 months. Neck trigger point injections at pain management. Never seen neurologist. Denies trauma, fever, chills, cp, SOB, abd pain, diarrhea, unusual foods (had soup last night, family shared meal, no others ill.)

## 2020-11-03 NOTE — ED ADULT NURSE NOTE - NSIMPLEMENTINTERV_GEN_ALL_ED
Implemented All Universal Safety Interventions:  [place] to call system. Call bell, personal items and telephone within reach. Instruct patient to call for assistance. Room bathroom lighting operational. Non-slip footwear when patient is off stretcher. Physically safe environment: no spills, clutter or unnecessary equipment. Stretcher in lowest position, wheels locked, appropriate side rails in place.

## 2020-11-03 NOTE — ED PROVIDER NOTE - PHYSICAL EXAMINATION
GEN: Pt fatigued in NAD, A&Ox3.  PSYCH: Affect and mood appropriate.  EYES: Sclera white w/o injection, EOMI, PERRLA.   ENT: Head NCAT. MM dry. Neck supple FROM. Airway patent.  RESP: No chest wall tenderness, CTA b/l, no wheezes, rales, or rhonchi.   CARDIAC: RRR, clear distinct S1, S2, no appreciable murmurs.  ABD: Abdomen soft, non-tender. No CVAT b/l.  MSK: Moving all extremities.  NEURO: No focal motor or sensory deficits.  VASC: Radial and dorsalis pedis pulses 2+ b/l. No edema or calf tenderness.  SKIN: No rashes or lesions.

## 2020-11-03 NOTE — ED ADULT NURSE NOTE - OBJECTIVE STATEMENT
Pt Qdoc, received to orange w/ 20G IV in left AC.     Pt 48 y/o female, AxOx3, presents to ED from home complaining of severe HA and n/v since this am. Pt reports Hx of severe migraines- seen in ED for similar complaint. Pt reporting nausea w/ multiple episodes of vomit today. Reporting headache pain 8/10 at this time. Pt is uncomfortable appearing, speaking full sentences without difficulty. Breathing spontaneous and unlabored. Upon assessment, abdomen soft and nontender, +strong peripheral pulses, moving all extremities without difficulty, lungs clear. Pt denies CP, SOB,  vision changes, diarrhea, fevers chills, abdominal pain. Safety and comfort measures initiated- bed placed in lowest position and side rails raised. Pt oriented to call bell system.

## 2020-11-03 NOTE — ED PROVIDER NOTE - PATIENT PORTAL LINK FT
You can access the FollowMyHealth Patient Portal offered by [facility] by registering at the following website: [email or web address]. By joining [facility]’s FollowMyHealth portal, you will also be able to view your health information using other applications (apps) compatible with our system.

## 2020-11-03 NOTE — ED PROVIDER NOTE - ATTENDING CONTRIBUTION TO CARE
Private Physician ANASTACIA Elias, PCP/[facility] Pain management  49y female pmh Migraine ha, No dm,htn,hld,cancer,cad,cva, Pt comes to ed complains of Bonilla for past several years and  received trigger point injections neck past few years. Pt was having ha and missed appointment. Pt has c/o ha,nv not able to drive to pain managent office. Not worst ha of life. PE WDWN female looking uncomfortable ncat neck supple esperanza, eom intact, cv no rubs, gallops or murmurs neuro gcs 15 speech fluent power 5/5 all extr pain light touch intact  Andrea Maciel MD, Facep

## 2020-11-03 NOTE — ED PROVIDER NOTE - NSFOLLOWUPINSTRUCTIONS_ED_ALL_ED_FT
Your migraine was treated in the ED tonight    You need to establish care with the neurology clinic by calling  ([phone]). The clinic is located at [address] [place] [address] in [place].     SEEK IMMEDIATE MEDICAL CARE IF YOU HAVE ANY OF THE FOLLOWING SYMPTOMS: fever, vomiting, stiff neck, loss of vision, problems with speech, muscle weakness, loss of balance, trouble walking, passing out, or confusion.

## 2020-11-04 VITALS — DIASTOLIC BLOOD PRESSURE: 52 MMHG | SYSTOLIC BLOOD PRESSURE: 90 MMHG

## 2020-11-04 PROCEDURE — 96376 TX/PRO/DX INJ SAME DRUG ADON: CPT

## 2020-11-04 PROCEDURE — 83735 ASSAY OF MAGNESIUM: CPT

## 2020-11-04 PROCEDURE — 84295 ASSAY OF SERUM SODIUM: CPT

## 2020-11-04 PROCEDURE — 83605 ASSAY OF LACTIC ACID: CPT

## 2020-11-04 PROCEDURE — 85014 HEMATOCRIT: CPT

## 2020-11-04 PROCEDURE — 86703 HIV-1/HIV-2 1 RESULT ANTBDY: CPT

## 2020-11-04 PROCEDURE — 96366 THER/PROPH/DIAG IV INF ADDON: CPT

## 2020-11-04 PROCEDURE — 99285 EMERGENCY DEPT VISIT HI MDM: CPT | Mod: 25

## 2020-11-04 PROCEDURE — 82947 ASSAY GLUCOSE BLOOD QUANT: CPT

## 2020-11-04 PROCEDURE — 85018 HEMOGLOBIN: CPT

## 2020-11-04 PROCEDURE — 96368 THER/DIAG CONCURRENT INF: CPT

## 2020-11-04 PROCEDURE — 82565 ASSAY OF CREATININE: CPT

## 2020-11-04 PROCEDURE — 82435 ASSAY OF BLOOD CHLORIDE: CPT

## 2020-11-04 PROCEDURE — 84132 ASSAY OF SERUM POTASSIUM: CPT

## 2020-11-04 PROCEDURE — 85025 COMPLETE CBC W/AUTO DIFF WBC: CPT

## 2020-11-04 PROCEDURE — 80053 COMPREHEN METABOLIC PANEL: CPT

## 2020-11-04 PROCEDURE — 96375 TX/PRO/DX INJ NEW DRUG ADDON: CPT

## 2020-11-04 PROCEDURE — 82803 BLOOD GASES ANY COMBINATION: CPT

## 2020-11-04 PROCEDURE — 82330 ASSAY OF CALCIUM: CPT

## 2020-11-04 PROCEDURE — 96365 THER/PROPH/DIAG IV INF INIT: CPT

## 2020-11-04 RX ADMIN — SODIUM CHLORIDE 1000 MILLILITER(S): 9 INJECTION INTRAMUSCULAR; INTRAVENOUS; SUBCUTANEOUS at 01:29

## 2020-11-04 RX ADMIN — Medication 1000 MILLIGRAM(S): at 01:29

## 2020-11-04 RX ADMIN — Medication 100 MILLIGRAM(S): at 00:49

## 2020-11-04 RX ADMIN — Medication 2 GRAM(S): at 01:29

## 2020-11-04 RX ADMIN — Medication 250 MILLIGRAM(S): at 01:29

## 2020-11-04 NOTE — CONSULT NOTE ADULT - ASSESSMENT
48yo woman with PMH of migraines (started in 2009) presents to the ED with a headache that started in the morning, similar in presentation to prior migraines. Physical exam was notable for photophobia and generalized weakness. Patient is likely experiencing a migraine headache. However, if symptoms persist differential includes IIH, although patient at this time does not describe visual obscuration or diplopia.    Recommendations:  [] Mg 2mg IV  [] Solumedrol 250mg IV x1  [] If symptoms persist despite above therapies, can consider Depakote 500mg IV x1 if patient is not pregnant  [] If no improvement with medical management, may need to consider MRI brain w and w/o contrast for further evaluation  [] Follow-up with Neurology clinic at [address] [place] [address] ([phone]) for further management and to establish care    Case to be seen and discussed with neurology attending Dr. Arora.

## 2020-11-04 NOTE — CONSULT NOTE ADULT - ATTENDING COMMENTS
Pt is 48 yo woman with history of migraine with more persistent presentation.  Would treat as above and follow up for further consideration of acute and preventive management.

## 2020-11-04 NOTE — CONSULT NOTE ADULT - SUBJECTIVE AND OBJECTIVE BOX
Neurology  Consult Note  11-04-20    Name:  MARNI HANNON; 49y (1970)    Neurology consult: 48yo woman with PMH of migraines (started in 2009) presents to the ED with a headache that started in the morning. Patient reports that when she awoke she noticed she had a severe headache. The headache is throbbing, R-sided, primarily frontal, and associated with photophobia, phonophobia, nausea, and vomiting. Patient reported vomiting all day and reported improvement with management in the ED. Patient reports the last time she had a similar headache was November 2019, but she has headaches every week that last approximately 3 days. Patient takes Excedrin for her headaches and does not have a neurologist but as per EMR, patient sees pain management for trigger injections. Patient denies loss of vision, weakness, numbness, trauma, fever, or recent illness. In the ED, patient received Benadryl and Toradol x2, in addition to Zofran with improvement of her vomiting but no significant improvement of her headache.      Review of Systems:  As states in HPI.    cinnamon (Unknown)  penicillin (Hives)      PMHx:   Migraines    PSuHx:   S/P tubal ligation        Medications:  MEDICATIONS  (STANDING):  methylPREDNISolone sodium succinate IVPB 250 milliGRAM(s) IV Intermittent Once      Vitals:  T(C): 36.4 (11-03-20 @ 21:00), Max: 36.4 (11-03-20 @ 18:17)  HR: 72 (11-03-20 @ 22:13) (72 - 84)  BP: 108/78 (11-03-20 @ 22:13) (106/69 - 122/82)  RR: 18 (11-03-20 @ 22:13) (18 - 22)  SpO2: 98% (11-03-20 @ 22:13) (98% - 99%)    Physical Examination:  General: Appears lethargic, intermittently falling asleep during the assessment, but in no apparent distress  Neurologic:  - Mental Status: Alert, awake, oriented to person, place, and time; Speech is fluent with intact naming, repetition, and comprehension  - Cranial Nerves:  II: Visual fields are full to confrontation; Pupils are equal, round, and reactive to light  III, IV, VI: Extraocular movements are intact without nystagmus or diplopia  V: Facial sensation is intact in the V1-V3 distribution bilaterally. No sinus tenderness to palpation.  VII: Face is symmetric with normal eye closure and smile.  VIII: Hearing is intact to finger rub.  IX, X: Uvula is midline and soft palate rises symmetrically.  XI: Head turning and shoulder shrug are intact.  XII: Tongue protrudes in the midline.  - Motor: Strength is 4/5 throughout  - Reflexes: 2+ and symmetric at the biceps and knees. Plantar responses down bilaterally.  - Sensory: Intact throughout to light touch  - Gait: deferred    Labs:                        12.1   5.51  )-----------( 192      ( 03 Nov 2020 18:40 )             38.5     11-03    134<L>  |  102  |  9   ----------------------------<  127<H>  3.6   |  24  |  0.66    Ca    9.9      03 Nov 2020 18:40  Mg     1.9     11-03    TPro  7.2  /  Alb  4.5  /  TBili  0.4  /  DBili  x   /  AST  35  /  ALT  47<H>  /  AlkPhos  119  11-03    CAPILLARY BLOOD GLUCOSE        LIVER FUNCTIONS - ( 03 Nov 2020 18:40 )  Alb: 4.5 g/dL / Pro: 7.2 g/dL / ALK PHOS: 119 U/L / ALT: 47 U/L / AST: 35 U/L / GGT: x

## 2021-03-23 ENCOUNTER — EMERGENCY (EMERGENCY)
Facility: HOSPITAL | Age: 51
LOS: 1 days | Discharge: HOME | End: 2021-03-23
Attending: STUDENT IN AN ORGANIZED HEALTH CARE EDUCATION/TRAINING PROGRAM
Payer: MEDICAID

## 2021-03-23 VITALS
RESPIRATION RATE: 19 BRPM | DIASTOLIC BLOOD PRESSURE: 83 MMHG | WEIGHT: 141.98 LBS | OXYGEN SATURATION: 98 % | SYSTOLIC BLOOD PRESSURE: 132 MMHG | HEIGHT: 66 IN | HEART RATE: 99 BPM | TEMPERATURE: 98 F

## 2021-03-23 VITALS
SYSTOLIC BLOOD PRESSURE: 107 MMHG | RESPIRATION RATE: 20 BRPM | HEART RATE: 77 BPM | TEMPERATURE: 98 F | DIASTOLIC BLOOD PRESSURE: 64 MMHG | OXYGEN SATURATION: 97 %

## 2021-03-23 DIAGNOSIS — Z98.51 TUBAL LIGATION STATUS: Chronic | ICD-10-CM

## 2021-03-23 LAB
ALBUMIN SERPL ELPH-MCNC: 4.5 G/DL — SIGNIFICANT CHANGE UP (ref 3.3–5)
ALP SERPL-CCNC: 108 U/L — SIGNIFICANT CHANGE UP (ref 40–120)
ALT FLD-CCNC: 49 U/L — HIGH (ref 10–45)
ANION GAP SERPL CALC-SCNC: 12 MMOL/L — SIGNIFICANT CHANGE UP (ref 5–17)
APPEARANCE UR: CLEAR — SIGNIFICANT CHANGE UP
AST SERPL-CCNC: 37 U/L — SIGNIFICANT CHANGE UP (ref 10–40)
BACTERIA # UR AUTO: NEGATIVE — SIGNIFICANT CHANGE UP
BASOPHILS # BLD AUTO: 0.05 K/UL — SIGNIFICANT CHANGE UP (ref 0–0.2)
BASOPHILS NFR BLD AUTO: 0.9 % — SIGNIFICANT CHANGE UP (ref 0–2)
BILIRUB SERPL-MCNC: 0.2 MG/DL — SIGNIFICANT CHANGE UP (ref 0.2–1.2)
BILIRUB UR-MCNC: NEGATIVE — SIGNIFICANT CHANGE UP
BUN SERPL-MCNC: 10 MG/DL — SIGNIFICANT CHANGE UP (ref 7–23)
CALCIUM SERPL-MCNC: 9.8 MG/DL — SIGNIFICANT CHANGE UP (ref 8.4–10.5)
CHLORIDE SERPL-SCNC: 105 MMOL/L — SIGNIFICANT CHANGE UP (ref 96–108)
CO2 SERPL-SCNC: 25 MMOL/L — SIGNIFICANT CHANGE UP (ref 22–31)
COLOR SPEC: SIGNIFICANT CHANGE UP
CREAT SERPL-MCNC: 0.72 MG/DL — SIGNIFICANT CHANGE UP (ref 0.5–1.3)
DIFF PNL FLD: ABNORMAL
EOSINOPHIL # BLD AUTO: 0.05 K/UL — SIGNIFICANT CHANGE UP (ref 0–0.5)
EOSINOPHIL NFR BLD AUTO: 0.9 % — SIGNIFICANT CHANGE UP (ref 0–6)
EPI CELLS # UR: 0 /HPF — SIGNIFICANT CHANGE UP
GLUCOSE SERPL-MCNC: 98 MG/DL — SIGNIFICANT CHANGE UP (ref 70–99)
GLUCOSE UR QL: NEGATIVE — SIGNIFICANT CHANGE UP
HCT VFR BLD CALC: 40.6 % — SIGNIFICANT CHANGE UP (ref 34.5–45)
HGB BLD-MCNC: 12.6 G/DL — SIGNIFICANT CHANGE UP (ref 11.5–15.5)
IMM GRANULOCYTES NFR BLD AUTO: 0.4 % — SIGNIFICANT CHANGE UP (ref 0–1.5)
KETONES UR-MCNC: NEGATIVE — SIGNIFICANT CHANGE UP
LEUKOCYTE ESTERASE UR-ACNC: NEGATIVE — SIGNIFICANT CHANGE UP
LYMPHOCYTES # BLD AUTO: 1.76 K/UL — SIGNIFICANT CHANGE UP (ref 1–3.3)
LYMPHOCYTES # BLD AUTO: 33.2 % — SIGNIFICANT CHANGE UP (ref 13–44)
MAGNESIUM SERPL-MCNC: 2.2 MG/DL — SIGNIFICANT CHANGE UP (ref 1.6–2.6)
MCHC RBC-ENTMCNC: 27.2 PG — SIGNIFICANT CHANGE UP (ref 27–34)
MCHC RBC-ENTMCNC: 31 GM/DL — LOW (ref 32–36)
MCV RBC AUTO: 87.7 FL — SIGNIFICANT CHANGE UP (ref 80–100)
MONOCYTES # BLD AUTO: 0.52 K/UL — SIGNIFICANT CHANGE UP (ref 0–0.9)
MONOCYTES NFR BLD AUTO: 9.8 % — SIGNIFICANT CHANGE UP (ref 2–14)
NEUTROPHILS # BLD AUTO: 2.9 K/UL — SIGNIFICANT CHANGE UP (ref 1.8–7.4)
NEUTROPHILS NFR BLD AUTO: 54.8 % — SIGNIFICANT CHANGE UP (ref 43–77)
NITRITE UR-MCNC: NEGATIVE — SIGNIFICANT CHANGE UP
NRBC # BLD: 0 /100 WBCS — SIGNIFICANT CHANGE UP (ref 0–0)
PH UR: 6.5 — SIGNIFICANT CHANGE UP (ref 5–8)
PHOSPHATE SERPL-MCNC: 3.7 MG/DL — SIGNIFICANT CHANGE UP (ref 2.5–4.5)
PLATELET # BLD AUTO: 251 K/UL — SIGNIFICANT CHANGE UP (ref 150–400)
POTASSIUM SERPL-MCNC: 3.7 MMOL/L — SIGNIFICANT CHANGE UP (ref 3.5–5.3)
POTASSIUM SERPL-SCNC: 3.7 MMOL/L — SIGNIFICANT CHANGE UP (ref 3.5–5.3)
PROT SERPL-MCNC: 7.5 G/DL — SIGNIFICANT CHANGE UP (ref 6–8.3)
PROT UR-MCNC: NEGATIVE — SIGNIFICANT CHANGE UP
RBC # BLD: 4.63 M/UL — SIGNIFICANT CHANGE UP (ref 3.8–5.2)
RBC # FLD: 13.6 % — SIGNIFICANT CHANGE UP (ref 10.3–14.5)
RBC CASTS # UR COMP ASSIST: 1 /HPF — SIGNIFICANT CHANGE UP (ref 0–4)
SODIUM SERPL-SCNC: 142 MMOL/L — SIGNIFICANT CHANGE UP (ref 135–145)
SP GR SPEC: 1.01 — LOW (ref 1.01–1.02)
TROPONIN T, HIGH SENSITIVITY RESULT: <6 NG/L — SIGNIFICANT CHANGE UP (ref 0–51)
UROBILINOGEN FLD QL: NEGATIVE — SIGNIFICANT CHANGE UP
WBC # BLD: 5.3 K/UL — SIGNIFICANT CHANGE UP (ref 3.8–10.5)
WBC # FLD AUTO: 5.3 K/UL — SIGNIFICANT CHANGE UP (ref 3.8–10.5)
WBC UR QL: 1 /HPF — SIGNIFICANT CHANGE UP (ref 0–5)

## 2021-03-23 PROCEDURE — 36000 PLACE NEEDLE IN VEIN: CPT

## 2021-03-23 PROCEDURE — 71046 X-RAY EXAM CHEST 2 VIEWS: CPT

## 2021-03-23 PROCEDURE — 84484 ASSAY OF TROPONIN QUANT: CPT

## 2021-03-23 PROCEDURE — 84100 ASSAY OF PHOSPHORUS: CPT

## 2021-03-23 PROCEDURE — 85025 COMPLETE CBC W/AUTO DIFF WBC: CPT

## 2021-03-23 PROCEDURE — 93005 ELECTROCARDIOGRAM TRACING: CPT

## 2021-03-23 PROCEDURE — 71046 X-RAY EXAM CHEST 2 VIEWS: CPT | Mod: 26

## 2021-03-23 PROCEDURE — 87086 URINE CULTURE/COLONY COUNT: CPT

## 2021-03-23 PROCEDURE — 99284 EMERGENCY DEPT VISIT MOD MDM: CPT | Mod: 25

## 2021-03-23 PROCEDURE — 81001 URINALYSIS AUTO W/SCOPE: CPT

## 2021-03-23 PROCEDURE — 80053 COMPREHEN METABOLIC PANEL: CPT

## 2021-03-23 PROCEDURE — 99285 EMERGENCY DEPT VISIT HI MDM: CPT

## 2021-03-23 PROCEDURE — 84443 ASSAY THYROID STIM HORMONE: CPT

## 2021-03-23 PROCEDURE — 93010 ELECTROCARDIOGRAM REPORT: CPT

## 2021-03-23 PROCEDURE — 83735 ASSAY OF MAGNESIUM: CPT

## 2021-03-23 RX ORDER — SODIUM CHLORIDE 9 MG/ML
1000 INJECTION INTRAMUSCULAR; INTRAVENOUS; SUBCUTANEOUS ONCE
Refills: 0 | Status: COMPLETED | OUTPATIENT
Start: 2021-03-23 | End: 2021-03-23

## 2021-03-23 RX ADMIN — SODIUM CHLORIDE 1000 MILLILITER(S): 9 INJECTION INTRAMUSCULAR; INTRAVENOUS; SUBCUTANEOUS at 21:22

## 2021-03-23 NOTE — ED PROVIDER NOTE - RAPID ASSESSMENT
50 F presents to the ER c/o chest pressure, bilateral upper extremity numbness, and palpitations beginning at 3am. Currently notes left hand and neck numbness. Denies hx of MI or CVA.    Scribe Statement: Bhupendra LATHAM Tiffany, attest that this documentation has been prepared under the direction and in the presence of Martell Ramirez) 50 F presents to the ER c/o chest pressure, bilateral upper extremity numbness, and palpitations beginning at 3am. Currently notes left hand and right neck numbness. Denies hx of MI or CVA.  Actively moving all extremities on evaluation.  Does not appear to be lateralizing by history or observation.    Scribe Statement: Bhupendra LATHAM Tiffany, attest that this documentation has been prepared under the direction and in the presence of Martell Ramirez)

## 2021-03-23 NOTE — ED PROVIDER NOTE - NS ED ROS FT
GENERAL: No fever or chills  EYES: no change in vision  HEENT: no trouble swallowing or speaking  CARDIAC: see hpi  PULMONARY: no cough or SOB  GI: no abdominal pain, nausea, vomiting, diarrhea, or constipation   : see hpi  SKIN: no rashes  NEURO: no headache, numbness, or weakness.  MSK: No joint pain

## 2021-03-23 NOTE — ED PROVIDER NOTE - CLINICAL SUMMARY MEDICAL DECISION MAKING FREE TEXT BOX
49 yo F with hx of migraines presents with chest pain, palpitations, urinary frequency,  and blt arm numbness since 3AM today. VS WNL. Symmetrical motor and sensory exam. No focal deficits. Symmetrical pulses. Low heart score. Ddx CAD, thyroid pathology, dehydration, anxiety. No concern for PE or acute stroke. Troponin, TSH, UA, XR, ekg, , electrolytes and reassess.

## 2021-03-23 NOTE — ED PROVIDER NOTE - OBJECTIVE STATEMENT
49 yo F with hx of migraines presents with chest pain, palpitations and blt arm numbness since 3AM today. Reports walking up with palpitations and a blt upper chest throbbing pain, non-exertional, non-plueritic, improved with palpations. Also noticed upper and lower lip numbness and blt arm numbness. Pt reports significant stress recently, as her mom  1 month ago and multiple members of her family tested posotivie for covid, patient has 2 negative covid tests. No SI or HI. Denies hx of MS, or thyroid disease. No fevers or cough. Denies ilicit drug use or caffeine use. No alcohol use. No hx of DVTs or leg swelling. Reports one day of urinary frequency but no dysuria or vaginal discharge. LMP 2019.

## 2021-03-23 NOTE — ED ADULT NURSE NOTE - OBJECTIVE STATEMENT
Pt is a 49 y/o female c/o episode of palpitations at 0330 last night waking her out of her sleep. States she also had numbness down arms and on lips. States she has hx anxiety, feels it could be related to anxiety. States she has felt anxious r/t fear of covid, states she has to clear her throat frequently, denies sick contacts or any other symptoms. States she has never had palpitations in past. Denies SOB, N/V/D, cough, fever, chills, dizziness, weakness, headache. A&Ox3. Breathing unlabored and spontaneous. Abdomen soft, nontender, nondistended. Full ROM and strength of extremities. Safety and comfort measures provided, call bell provided to pt and bed in lowest position.

## 2021-03-23 NOTE — ED PROVIDER NOTE - PATIENT PORTAL LINK FT
You can access the FollowMyHealth Patient Portal offered by [facility] by registering at the following website: [email or web address]. By joining [employer]’s FollowMyHealth portal, you will also be able to view your health information using other applications (apps) compatible with our system.

## 2021-03-23 NOTE — ED PROVIDER NOTE - NSFOLLOWUPINSTRUCTIONS_ED_ALL_ED_FT
You were seen in the ED for chest pain and lip / arm numbness. Cardiac work up within normal limits.   The following labs/imaging were obtained: see attached (if applicable)  Continue home medications (if any).   Return to the ED if you develop worsening chest pain, shortness of breath, or worsening or new concerning symptoms.  Follow up with your primary care in 2-3 days. Follow up with neurology within 1 week. See attached.   Discussed with pt results of work up, strict return precautions, and need for follow up.  Pt expressed understanding and agrees with plan.

## 2021-03-24 LAB — TSH SERPL-MCNC: 1.98 UIU/ML — SIGNIFICANT CHANGE UP (ref 0.27–4.2)

## 2021-03-24 NOTE — ED POST DISCHARGE NOTE - ADDITIONAL DOCUMENTATION
3/24/21: Patient called asking for TSH level. Result given. Per patient has follow up with her PCP and will discuss results. -Sunshine Humphreys PA-C

## 2021-03-25 LAB
CULTURE RESULTS: SIGNIFICANT CHANGE UP
SPECIMEN SOURCE: SIGNIFICANT CHANGE UP

## 2021-05-06 ENCOUNTER — TRANSCRIPTION ENCOUNTER (OUTPATIENT)
Age: 51
End: 2021-05-06

## 2021-11-11 NOTE — ED PROVIDER NOTE - EAR
Cimzia Pregnancy And Lactation Text: This medication crosses the placenta but can be considered safe in certain situations. Cimzia may be excreted in breast milk. bilateral TM's clear

## 2021-11-17 ENCOUNTER — EMERGENCY (EMERGENCY)
Facility: HOSPITAL | Age: 51
LOS: 1 days | Discharge: HOME | End: 2021-11-17
Attending: EMERGENCY MEDICINE
Payer: COMMERCIAL

## 2021-11-17 VITALS
DIASTOLIC BLOOD PRESSURE: 74 MMHG | RESPIRATION RATE: 16 BRPM | TEMPERATURE: 98 F | OXYGEN SATURATION: 99 % | SYSTOLIC BLOOD PRESSURE: 127 MMHG | HEART RATE: 88 BPM | WEIGHT: 149.91 LBS | HEIGHT: 66 IN

## 2021-11-17 VITALS
RESPIRATION RATE: 16 BRPM | SYSTOLIC BLOOD PRESSURE: 113 MMHG | OXYGEN SATURATION: 99 % | TEMPERATURE: 98 F | DIASTOLIC BLOOD PRESSURE: 75 MMHG | HEART RATE: 90 BPM

## 2021-11-17 DIAGNOSIS — Z98.51 TUBAL LIGATION STATUS: Chronic | ICD-10-CM

## 2021-11-17 PROCEDURE — 73552 X-RAY EXAM OF FEMUR 2/>: CPT | Mod: 26,RT

## 2021-11-17 PROCEDURE — 99284 EMERGENCY DEPT VISIT MOD MDM: CPT | Mod: 25

## 2021-11-17 PROCEDURE — 73130 X-RAY EXAM OF HAND: CPT

## 2021-11-17 PROCEDURE — 73030 X-RAY EXAM OF SHOULDER: CPT | Mod: 26,RT

## 2021-11-17 PROCEDURE — 29125 APPL SHORT ARM SPLINT STATIC: CPT

## 2021-11-17 PROCEDURE — 73080 X-RAY EXAM OF ELBOW: CPT

## 2021-11-17 PROCEDURE — 73552 X-RAY EXAM OF FEMUR 2/>: CPT

## 2021-11-17 PROCEDURE — 73564 X-RAY EXAM KNEE 4 OR MORE: CPT | Mod: 26,RT

## 2021-11-17 PROCEDURE — 73130 X-RAY EXAM OF HAND: CPT | Mod: 26,RT

## 2021-11-17 PROCEDURE — 73564 X-RAY EXAM KNEE 4 OR MORE: CPT

## 2021-11-17 PROCEDURE — 73110 X-RAY EXAM OF WRIST: CPT

## 2021-11-17 PROCEDURE — 73080 X-RAY EXAM OF ELBOW: CPT | Mod: 26,RT

## 2021-11-17 PROCEDURE — 73110 X-RAY EXAM OF WRIST: CPT | Mod: 26,RT

## 2021-11-17 PROCEDURE — 73030 X-RAY EXAM OF SHOULDER: CPT

## 2021-11-17 RX ORDER — ACETAMINOPHEN 500 MG
975 TABLET ORAL ONCE
Refills: 0 | Status: COMPLETED | OUTPATIENT
Start: 2021-11-17 | End: 2021-11-17

## 2021-11-17 RX ADMIN — Medication 975 MILLIGRAM(S): at 20:48
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
## 2021-11-17 NOTE — ED PROVIDER NOTE - PHYSICAL EXAMINATION
GENERAL: AAOx4, GCS 15, NAD, WDWN;   HEENT: MMM, PERRLA, EOMI, nonicteric sclera;   NECK: No midline spinal tenderness; Full ROM   PULM: CTAB, no crackles/rubs/rales;   CV: RRR, S1S2, no MRG;   ABD: Flat abdomen, NTND, no R/G/R,   MSK: JEROME, +2 pulses x4;  R lateral knee tenderness diffusely; no quad or patellar tenderness; Full ROM in bilateral knees; Tenderness but no laxity with valgus and varus stress bilaterally; TTP diffusely over R hand worse in R thumb: + R snuffbox tenderness; + tenderness with axial load of R thumb; TTP over diffuse R Shoulder; Full ROM in R shoulder; R lateral elbow tenderness; Full ROM and 5/5 strength with elbow flexion and extension   SKIN: abrasion to R thumb; ecchymosis to R lateral knee   NEURO: No obvious focal deficits; decreased sensation to gross touch on entire R hand   PSYCH: AAOx3, clear thought and normal sensorium.

## 2021-11-17 NOTE — ED ADULT NURSE NOTE - CAS ELECT INFOMATION PROVIDED
fu with orthopedic surgeon, keep splint on for 2weeks, fu with PMD, tylenol/motrin as needed for pain/DC instructions

## 2021-11-17 NOTE — ED PROVIDER NOTE - NSFOLLOWUPINSTRUCTIONS_ED_ALL_ED_FT
You were evaluated in the Emergency Department for arm and knee pain.  You were evaluated and examined by a physician, and your x-rays did not show any acute fractures. Your were placed in a splint for concern about a possible scaphoid fracture which cannot be seen early on x-ray.     There are no signs of emergency conditions requiring admission to the hospital on today's workup.     We recommend that you:  1. See Orthopedic Surgeon within the next 2 weeks for follow up.  Bring a copy of your discharge paperwork (including any test results) to your doctor.  2. please keep your splint in place for the next 2 weeks.   3. Please take Ibuprofen 600mg every 6 hours as needed for pain. Please take with food  4. Please take Tylenol 1000mg every 6 hours as needed for pain. Please do not take more than 4000mg a day.       Return immediately if you have worsening symptoms, worsening numbness, tingling,, or any other new/concerning symptoms.
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
## 2021-11-17 NOTE — ED PROVIDER NOTE - PROGRESS NOTE DETAILS
Given snuffbox tenderness. Patient placed in thumb spica splint. Pt reassessed at bedside, feels well, pain controlled. Informed of workup in ED, reviewed radiology results with patient. Informed pt of plan for discharge with instructions to follow up with Orthopedic Surgeon. Pt/caregiver expressed understanding of plan and agrees with plan for discharge. Strict return precautions discussed with patient in layman's terms, patient demonstrated understanding of return precautions. Karina Reece MD
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
## 2021-11-17 NOTE — ED ADULT NURSE NOTE - NS_SISCREENINGSR_GEN_ALL_ED
Negative Bilateral simple mastectomies, Left Breast sentinel  Lymph node biopsy, Bilateral Breast Reconstruction with expanders on 8/25/2017.   Pre- Op instructions discussed   Labs sent
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
## 2021-11-17 NOTE — ED ADULT NURSE NOTE - PAIN RATING/NUMBER SCALE (0-10): REST
Anesthesia ROS/Med Hx    Overall Review:  Pts. EKG was reviewed, Pts.echo was reviewed and Pts.stress test was reviewed     Pulmonary Review:    Pt. positive for COPD   The patient is a current smoker with a 31 pack-year history.     Neuro/Psych Review:    Pt. positive for psychiatric history    Cardiovascular Review:    Pt. positive for hypertension  Pt. positive for hyperlipidemia    GI/HEPATIC/RENAL Review:    Pt. positive for GERD    End/Other Review:    Pt. positive for anemia  Pt. positive for substance abuse (Chronic alcohol)  Overall Review of Systems Comments:  4/18/2019 Echo  EF = 63%      Anesthesia Plan     ASA Status: 3  Anesthesia Type: MAC  Induction: Intravenous  Reviewed: Lab Results, Patient Summary, Past Med History, Beta Blocker Status, Allergies, Nursing Notes, Consultations, Medications, Problem List, EKG, DNR Status and NPO Status  The proposed anesthetic plan, including its risks and benefits, have been discussed with the Patient - along with the risks and benefits of alternatives.  Questions were encouraged and answered and the patient and/or representative agrees to proceed.  Blood Products: Not Anticipated  Plan Comments: Pt not on BB. No AB ordered. He reportedly smokes 1 ppd x 31 yrs, drinks 42-84 beers / wk, and doesn't use illicit drugs. Hct 21.5%, LVEF 63 %, stress test neg for ischemia.       Physical Exam  Mallampati: II  TM Distance: >3 FB  Neck ROM: Full  Cardio Rhythm: Regular  Cardio Rate: Normal  Breath sounds clear to auscultation:  Yes                   8

## 2021-11-17 NOTE — ED PROVIDER NOTE - PATIENT PORTAL LINK FT
You can access the FollowMyHealth Patient Portal offered by [facility] by registering at the following website: [email or web address]. By joining [employer]’s FollowMyHealth portal, you will also be able to view your health information using other applications (apps) compatible with our system.

## 2021-11-17 NOTE — ED PROVIDER NOTE - CARE PROVIDER_API CALL
Maykel Hughes)  Orthopaedic Surgery; Surgery of the Hand  [address], [address]  [place] [address]  Phone: [phone]  Fax: [phone]  Follow Up Time:

## 2021-11-26 NOTE — ED PROCEDURE NOTE - PROCEDURE ADDITIONAL DETAILS
R thumb spica splint applied.  Pre/post procedure NVI.  No complications.  Pto tolerated well.  --BMM

## 2022-01-27 ENCOUNTER — EMERGENCY (EMERGENCY)
Facility: HOSPITAL | Age: 52
LOS: 1 days | Discharge: HOME | End: 2022-01-27
Attending: EMERGENCY MEDICINE
Payer: MEDICAID

## 2022-01-27 VITALS
HEART RATE: 84 BPM | DIASTOLIC BLOOD PRESSURE: 65 MMHG | RESPIRATION RATE: 20 BRPM | TEMPERATURE: 98 F | SYSTOLIC BLOOD PRESSURE: 115 MMHG | OXYGEN SATURATION: 100 %

## 2022-01-27 VITALS
OXYGEN SATURATION: 99 % | TEMPERATURE: 98 F | WEIGHT: 149.91 LBS | DIASTOLIC BLOOD PRESSURE: 64 MMHG | RESPIRATION RATE: 18 BRPM | SYSTOLIC BLOOD PRESSURE: 99 MMHG | HEART RATE: 88 BPM | HEIGHT: 66 IN

## 2022-01-27 DIAGNOSIS — Z98.51 TUBAL LIGATION STATUS: Chronic | ICD-10-CM

## 2022-01-27 PROCEDURE — 71101 X-RAY EXAM UNILAT RIBS/CHEST: CPT

## 2022-01-27 PROCEDURE — 93005 ELECTROCARDIOGRAM TRACING: CPT

## 2022-01-27 PROCEDURE — 99284 EMERGENCY DEPT VISIT MOD MDM: CPT | Mod: 25

## 2022-01-27 PROCEDURE — 93010 ELECTROCARDIOGRAM REPORT: CPT

## 2022-01-27 PROCEDURE — 71101 X-RAY EXAM UNILAT RIBS/CHEST: CPT | Mod: 26

## 2022-01-27 RX ORDER — IBUPROFEN 200 MG
600 TABLET ORAL ONCE
Refills: 0 | Status: COMPLETED | OUTPATIENT
Start: 2022-01-27 | End: 2022-01-27

## 2022-01-27 RX ORDER — LIDOCAINE 4 G/100G
1 CREAM TOPICAL ONCE
Refills: 0 | Status: COMPLETED | OUTPATIENT
Start: 2022-01-27 | End: 2022-01-27

## 2022-01-27 RX ORDER — ACETAMINOPHEN 500 MG
650 TABLET ORAL ONCE
Refills: 0 | Status: COMPLETED | OUTPATIENT
Start: 2022-01-27 | End: 2022-01-27

## 2022-01-27 RX ADMIN — Medication 600 MILLIGRAM(S): at 18:43

## 2022-01-27 RX ADMIN — LIDOCAINE 1 PATCH: 4 CREAM TOPICAL at 18:47

## 2022-01-27 RX ADMIN — Medication 650 MILLIGRAM(S): at 18:43

## 2022-01-27 NOTE — ED PROVIDER NOTE - PROGRESS NOTE DETAILS
[place] PGY-2: Patient reassessed, resting comfortably. Discussed negative XR for fracture but incidentally noted stable L ?breast nodule. Patient to follow up with PMD. Patient requests allergist information, will give [facility] info. Return precautions given.

## 2022-01-27 NOTE — ED PROVIDER NOTE - NSFOLLOWUPCLINICS_GEN_ALL_ED_FT
[facility]  Allergy  [address] [place], [place] [address]  Phone: [phone]  Fax:

## 2022-01-27 NOTE — ED ADULT TRIAGE NOTE - HEART RATE (BEATS/MIN)
Physical Therapy Treatment Note       11/27/17 9965   Pain Assessment   Pain Assessment No/denies pain   Pain Score No Pain   Restrictions/Precautions   Weight Bearing Precautions Per Order No   Braces or Orthoses (none per pt)   Other Precautions Telemetry; Fall Risk;Cognitive   General   Chart Reviewed Yes   Response to Previous Treatment Patient with no complaints from previous session  Family/Caregiver Present Yes   Cognition   Overall Cognitive Status Impaired   Arousal/Participation Alert; Responsive; Cooperative   Attention Attends with cues to redirect   Orientation Level Oriented to person;Oriented to place; Disoriented to time;Oriented to situation   Memory Decreased recall of biographical information;Decreased recall of recent events;Decreased recall of precautions   Following Commands Follows one step commands with increased time or repetition   Subjective   Subjective "I would like to walk"   Bed Mobility   Additional Comments pt received seated at EOB upon arrival   Transfers   Sit to Stand 4  Minimal assistance  (CGA)   Additional items Assist x 1; Armrests; Increased time required;Verbal cues   Stand to Sit 4  Minimal assistance  (CGA)   Additional items Assist x 1; Armrests; Increased time required;Verbal cues   Ambulation/Elevation   Gait pattern Short stride; Step to   Gait Assistance 4  Minimal assist  (CGA)   Additional items Assist x 1;Verbal cues; Tactile cues  (for RW management, sequencing, R UE placement on RW)   Assistive Device Rolling walker   Distance 50' x3 attempts   Stair Management Assistance Not tested   Balance   Static Sitting Good   Dynamic Sitting Fair +   Static Standing Fair +   Dynamic Standing Fair   Ambulatory Fair   Endurance Deficit   Endurance Deficit Yes   Activity Tolerance   Activity Tolerance Patient limited by fatigue   Nurse Made Aware Yes, RN Yane verbalized pt appropriate for PT session, made aware of outcomes/recs   Assessment   Prognosis Good   Problem List Impaired balance;Decreased mobility; Decreased cognition   Assessment Pt seen for PT treatment session this date with interventions consisting of gait training w/ emphasis on improving pt's ability to ambulate level surfaces x 50' x3 with CGA provided by therapist with RW  Pt agreeable to PT treatment session upon arrival, pt found seated at EOB, in no apparent distress, A&O x 3 and responsive  In comparison to previous session, pt with improvements in leevl surface ambulation tolerance, although requiring CGA for all phases of mobility and vc for safety and sequencing, pt requiring A to place R UE onto Rw for support, note 2 episodes of uncorrected LOB requiring increased physical A  Post session: pt returned BTB, all needs in reach and RN notified of session findings/recommendations and NSG staff educated/encouraged to ambulate pt w/ use of RW  Recommend STR vs HHPT at time of d/c in order to maximize pt's functional independence and safety w/ mobility  Pt continues to be functioning below baseline level, and remains limited 2* factors listed above and including at risk for falls  PT will continue to see pt while here in order to address the deficits listed above and provide interventions consistent w/ POC in effort to achieve STGs  Barriers to Discharge Inaccessible home environment   Goals   Patient Goals to become independent again and to return home   STG Expiration Date 12/06/17   Treatment Day 1   Plan   Treatment/Interventions Functional transfer training;Elevations; Therapeutic exercise; Endurance training;Cognitive reorientation;Patient/family training;Equipment eval/education; Bed mobility;Gait training;Continued evaluation;Spoke to nursing;Family;OT  (higher level balance)   Progress Slow progress, decreased activity tolerance   PT Frequency 5x/wk   Recommendation   Recommendation Short-term skilled PT  (vs HHPT w/ family pending progress)   PT - OK to Discharge No   Additional Comments pt to demonstrate consistency w/ level surface amb and elevations prior to d/c home       Cathy Drop, PT, DPT    Time of PT treatment session: [id number]

## 2022-01-27 NOTE — ED PROVIDER NOTE - SKIN, MLM
Skin normal color for race, warm, dry and intact. No evidence of rash. No lesions. LE w/ no edema or calf tenderness.
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
## 2022-01-27 NOTE — ED PROVIDER NOTE - NSFOLLOWUPINSTRUCTIONS_ED_ALL_ED_FT
You were seen in the Emergency Department for: right rib pain    For pain/fever, you may take Tylenol (acetaminophen) 650 mg every 6 hours, AND/OR Advil (ibuprofen) 600 mg every 8 hours.    Please follow up with your primary physician. If you do not have a primary physician or specialist of your needs, please call [phone] to find one convenient for you. At this number you will be able to locate a provider who accepts your insurance, as well as locate the right specialist for your needs.    You should return to the Emergency Department if you feel any new/worsening/persistent symptoms including but not limited to: chest pain, difficulty breathing, loss of consciousness, bleeding, uncontrolled pain, numbness/weakness of a body part

## 2022-01-27 NOTE — ED PROVIDER NOTE - PATIENT PORTAL LINK FT
You can access the FollowMyHealth Patient Portal offered by [facility] by registering at the following website: [email or web address]. By joining [employer]’s FollowMyHealth portal, you will also be able to view your health information using other applications (apps) compatible with our system.

## 2022-01-27 NOTE — ED ADULT NURSE NOTE - OBJECTIVE STATEMENT
patient came in from home complaining of right lateral rib pains x 3 days & worsening since yesterday & today thus sought this visit. Denies sob, fever.  "I cough at times because of my allergies" Denies injury or any strenuous activities.
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
## 2022-03-25 ENCOUNTER — INPATIENT (INPATIENT)
Facility: HOSPITAL | Age: 52
LOS: 2 days | Discharge: HOME | DRG: 103 | End: 2022-03-28
Attending: PSYCHIATRY & NEUROLOGY | Admitting: PSYCHIATRY & NEUROLOGY
Payer: MEDICAID

## 2022-03-25 VITALS
WEIGHT: 147.93 LBS | OXYGEN SATURATION: 97 % | DIASTOLIC BLOOD PRESSURE: 74 MMHG | RESPIRATION RATE: 20 BRPM | HEART RATE: 74 BPM | SYSTOLIC BLOOD PRESSURE: 110 MMHG | HEIGHT: 66 IN | TEMPERATURE: 98 F

## 2022-03-25 DIAGNOSIS — Z98.51 TUBAL LIGATION STATUS: Chronic | ICD-10-CM

## 2022-03-25 LAB
HCT VFR BLD CALC: 39.3 % — SIGNIFICANT CHANGE UP (ref 34.5–45)
HGB BLD-MCNC: 12.1 G/DL — SIGNIFICANT CHANGE UP (ref 11.5–15.5)
MCHC RBC-ENTMCNC: 26.7 PG — LOW (ref 27–34)
MCHC RBC-ENTMCNC: 30.8 GM/DL — LOW (ref 32–36)
MCV RBC AUTO: 86.8 FL — SIGNIFICANT CHANGE UP (ref 80–100)
PLATELET # BLD AUTO: 181 K/UL — SIGNIFICANT CHANGE UP (ref 150–400)
RBC # BLD: 4.53 M/UL — SIGNIFICANT CHANGE UP (ref 3.8–5.2)
RBC # FLD: 13.5 % — SIGNIFICANT CHANGE UP (ref 10.3–14.5)
WBC # BLD: 5.95 K/UL — SIGNIFICANT CHANGE UP (ref 3.8–10.5)
WBC # FLD AUTO: 5.95 K/UL — SIGNIFICANT CHANGE UP (ref 3.8–10.5)

## 2022-03-25 PROCEDURE — 70496 CT ANGIOGRAPHY HEAD: CPT | Mod: 26,MA

## 2022-03-25 PROCEDURE — 99285 EMERGENCY DEPT VISIT HI MDM: CPT

## 2022-03-25 RX ORDER — SODIUM CHLORIDE 9 MG/ML
2000 INJECTION INTRAMUSCULAR; INTRAVENOUS; SUBCUTANEOUS ONCE
Refills: 0 | Status: COMPLETED | OUTPATIENT
Start: 2022-03-25 | End: 2022-03-25

## 2022-03-25 RX ORDER — ACETAMINOPHEN 500 MG
1000 TABLET ORAL ONCE
Refills: 0 | Status: COMPLETED | OUTPATIENT
Start: 2022-03-25 | End: 2022-03-25

## 2022-03-25 RX ORDER — VALPROIC ACID (AS SODIUM SALT) 250 MG/5ML
500 SOLUTION, ORAL ORAL ONCE
Refills: 0 | Status: COMPLETED | OUTPATIENT
Start: 2022-03-25 | End: 2022-03-25

## 2022-03-25 RX ORDER — SUMATRIPTAN SUCCINATE 4 MG/.5ML
25 INJECTION, SOLUTION SUBCUTANEOUS ONCE
Refills: 0 | Status: COMPLETED | OUTPATIENT
Start: 2022-03-25 | End: 2022-03-25

## 2022-03-25 RX ORDER — MAGNESIUM SULFATE 500 MG/ML
2 VIAL (ML) INJECTION ONCE
Refills: 0 | Status: COMPLETED | OUTPATIENT
Start: 2022-03-25 | End: 2022-03-25

## 2022-03-25 RX ORDER — ONDANSETRON 8 MG/1
4 TABLET, FILM COATED ORAL ONCE
Refills: 0 | Status: COMPLETED | OUTPATIENT
Start: 2022-03-25 | End: 2022-03-25

## 2022-03-25 RX ORDER — METOCLOPRAMIDE HCL 10 MG
10 TABLET ORAL ONCE
Refills: 0 | Status: COMPLETED | OUTPATIENT
Start: 2022-03-25 | End: 2022-03-25

## 2022-03-25 RX ORDER — DEXAMETHASONE 0.5 MG/5ML
10 ELIXIR ORAL ONCE
Refills: 0 | Status: COMPLETED | OUTPATIENT
Start: 2022-03-25 | End: 2022-03-25

## 2022-03-25 RX ORDER — KETOROLAC TROMETHAMINE 30 MG/ML
15 SYRINGE (ML) INJECTION ONCE
Refills: 0 | Status: DISCONTINUED | OUTPATIENT
Start: 2022-03-25 | End: 2022-03-25

## 2022-03-25 RX ADMIN — Medication 25 GRAM(S): at 18:23

## 2022-03-25 RX ADMIN — Medication 10 MILLIGRAM(S): at 18:00

## 2022-03-25 RX ADMIN — SUMATRIPTAN SUCCINATE 25 MILLIGRAM(S): 4 INJECTION, SOLUTION SUBCUTANEOUS at 22:11

## 2022-03-25 RX ADMIN — SODIUM CHLORIDE 2000 MILLILITER(S): 9 INJECTION INTRAMUSCULAR; INTRAVENOUS; SUBCUTANEOUS at 18:00

## 2022-03-25 RX ADMIN — Medication 15 MILLIGRAM(S): at 18:00

## 2022-03-25 RX ADMIN — Medication 15 MILLIGRAM(S): at 19:41

## 2022-03-25 RX ADMIN — ONDANSETRON 4 MILLIGRAM(S): 8 TABLET, FILM COATED ORAL at 18:00

## 2022-03-25 RX ADMIN — Medication 400 MILLIGRAM(S): at 17:59

## 2022-03-25 RX ADMIN — Medication 55 MILLIGRAM(S): at 19:42

## 2022-03-25 RX ADMIN — Medication 102 MILLIGRAM(S): at 19:41

## 2022-03-25 NOTE — ED PROVIDER NOTE - NS ED ATTENDING STATEMENT MOD
This was a shared visit with the RAMBO. I reviewed and verified the documentation and independently performed the documented:

## 2022-03-25 NOTE — ED PROVIDER NOTE - PROGRESS NOTE DETAILS
OTONIEL Bartltet: patient no longer vomiting. but continues to endorse severe headache. will give more toradol, trial decadron and trial depakote. will reassess if no improvement will contact neuro OTONIEL Bartlett: paged neurology as migraine has been persistent awaiting call back Bernice EM/IM PGY4: Pt reassessed by neurology, still with migraine symptoms. Plan to admit under neurology Dr. Enciso for further evaluation and management.

## 2022-03-25 NOTE — ED PROVIDER NOTE - CLINICAL SUMMARY MEDICAL DECISION MAKING FREE TEXT BOX
ESTRADA Baron, Attending: seen with RAMBO, reviewed chart, results, and helped create clinical plan.    Healthy 51 yoF, known recurrent migraines, often 1x/2 weeks, now with 4 days of typical migraine pain a/w NV. Does not see neuro regularly. Has had MRI in the past, no structural issues. No neuro sx. No f/c. No neck pain. No abd pain. Does have burning behind chest/throat from vomiting.    Non toxic. Not encephalopathic. Neck supple. FROM of neck. PERRL. No nystagmus. Equal  strength. No abd tp. Soft. Afebrile.      Suspect recurrent, persistent migraine. No red flags for more insidious process. Has had similar before. Had prior imaging. Plan for hydration, multi modal med approach.

## 2022-03-25 NOTE — ED ADULT NURSE REASSESSMENT NOTE - NS ED NURSE REASSESS COMMENT FT1
Received patient from RN, patient at baseline mental status, able to make needs known, NAD, VSS, patient agreeable to plan of care, pending meds, dispo, comfort and safety provided.

## 2022-03-25 NOTE — CONSULT NOTE ADULT - ASSESSMENT
51 y.o. F with PMH migraine (started in 2009) and cervical radiculopathy and disc herniation presented to the ED due to severe headache for 4 days. Neurology consulted for further evaluation. Headache started as 10/10, gradual increase in intensity, R sided then spread to all over the head. Currently b/l frontal with some pressures behind the eyes.  Not have photophobia or phonophobia or positional but moving in general makes worse. Received decadron 10 mg, depakote 500 mg, toradol/migraine/tylenol IV & sumatriptan in ED with minimal improvement. Labs unremarkable.     Impression: Headache likely due to status migrainosus (lasting > 72 hrs). Ddx include tension headache vs r/o CVT vs r/o SAH. However other differentials are at low suspicion    Recommendations:   [] F/u CTH and CTV  [] Migraine cocktail as needed with iv toradol, tylenol, magnesium  [] Neurocheck and vital per unit protocol  [] Patient can follow up with headache specialist at [address] [place] [address] 1-2 weeks after discharge. Please instruct the patient to call [phone] to schedule this appointment.     Case discussed with general neurology attending Dr. Bhatti   51 y.o. F with PMH migraine (started in 2009) and cervical radiculopathy and disc herniation presented to the ED due to severe headache for 4 days. Neurology consulted for further evaluation. Headache started as 10/10, gradual increase in intensity, R sided then spread to all over the head. Currently b/l frontal with some pressures behind the eyes.  Not have photophobia or phonophobia or positional but moving in general makes worse. Received decadron 10 mg, depakote 500 mg, toradol/migraine/tylenol IV & sumatriptan in ED with minimal improvement. Labs unremarkable.     Impression: Headache likely due to status migrainosus (lasting > 72 hrs). Ddx include tension headache (gatito with anxiety) vs r/o CVT vs r/o SAH. However other differentials are at low suspicion    Recommendations:   [] F/u CTH and CTV  [] Migraine cocktail as needed with iv toradol, tylenol, magnesium  [] Neurocheck and vital per unit protocol  [] Patient can follow up with headache specialist at [address] [place] [address] 1-2 weeks after discharge. Please instruct the patient to call [phone] to schedule this appointment.     Case discussed with general neurology attending Dr. Bhatti   51 y.o. F with PMH migraine (started in 2009) and cervical radiculopathy and disc herniation presented to the ED due to severe headache for 4 days. Neurology consulted for further evaluation. Headache started as 10/10, gradual increase in intensity, R sided then spread to all over the head. Currently b/l frontal with some pressures behind the eyes.  Not have photophobia or phonophobia or positional but moving in general makes worse. Received decadron 10 mg, depakote 500 mg, toradol/migraine/tylenol IV & sumatriptan in ED with minimal improvement. Labs unremarkable. CTH and CTV neg.    Impression: Headache likely due to status migrainosus (lasting > 72 hrs). Ddx include tension headache (gatito with anxiety) vs r/o CVT vs r/o SAH. However other differentials are at low suspicion    Recommendations:   [x] F/u CTH and CTV  [] If sxs improve, no contraindication for discharge and outpt management migraine with specialist. If persisting, can contact [phone]  [] Migraine cocktail as needed with iv toradol, tylenol, magnesium  [] Neurocheck and vital per unit protocol  [] Patient can follow up with headache specialist at [address] [place] [address] 1-2 weeks after discharge. Please instruct the patient to call [phone] to schedule this appointment.     Case discussed with general neurology attending Dr. Bhatti   51 y.o. F with PMH migraine (started in 2009) and cervical radiculopathy and disc herniation presented to the ED due to severe headache for 4 days. Neurology consulted for further evaluation. Headache started as 10/10, gradual increase in intensity, R sided then spread to all over the head. Currently b/l frontal with some pressures behind the eyes.  Not have photophobia or phonophobia or positional but moving in general makes worse. Received decadron 10 mg, depakote 500 mg, toradol/migraine/tylenol IV & sumatriptan in ED with minimal improvement. Labs unremarkable. CTH and CTV neg.    Impression: Headache likely due to status migrainosus (lasting > 72 hrs). Ddx include tension headache (gatito with anxiety) vs r/o CVT vs r/o SAH. However other differentials are at low suspicion    Recommendations:   [x] F/u CTH and CTV  [] If sxs improve, no contraindication for discharge and outpt management migraine with specialist. If persisting, can contact [phone]  [] Migraine cocktail as needed with iv toradol, tylenol, magnesium  [] Can do trial of flexeril 5 mg qhs or tizanidine 4 mg q6 prn for generalized pain and neck pain  [] Neurocheck and vital per unit protocol  [] Patient can follow up with headache specialist at [address] [place] [address] 1-2 weeks after discharge. Please instruct the patient to call [phone] to schedule this appointment.     Case discussed with general neurology attending Dr. Bhatti

## 2022-03-25 NOTE — ED PROVIDER NOTE - OBJECTIVE STATEMENT
52 y/o female PMHx chronic migraines occurs every 2 weeks for a few days at a time, not on chronic preventative medications now presenting to the ED with severe headache with multiple episodes of NBNB emesis x4 days. Patient reported the pain is diffuse with photosensitivity and sensitivity to sounds. Patient has tried Tylenol and excedrine with no improvement of symptoms. Patient denied slurred speech, blurry vision, facial droop, confusion, trauma, fall, numbness weakness, CP, SOB, abdominal pain
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
## 2022-03-26 ENCOUNTER — TRANSCRIPTION ENCOUNTER (OUTPATIENT)
Age: 52
End: 2022-03-26

## 2022-03-26 DIAGNOSIS — R51.9 HEADACHE, UNSPECIFIED: ICD-10-CM

## 2022-03-26 LAB
ALBUMIN SERPL ELPH-MCNC: 4.1 G/DL — SIGNIFICANT CHANGE UP (ref 3.3–5)
ALP SERPL-CCNC: 74 U/L — SIGNIFICANT CHANGE UP (ref 40–120)
ALT FLD-CCNC: 19 U/L — SIGNIFICANT CHANGE UP (ref 10–45)
ANION GAP SERPL CALC-SCNC: 12 MMOL/L — SIGNIFICANT CHANGE UP (ref 5–17)
AST SERPL-CCNC: 27 U/L — SIGNIFICANT CHANGE UP (ref 10–40)
BASOPHILS # BLD AUTO: 0 K/UL — SIGNIFICANT CHANGE UP (ref 0–0.2)
BASOPHILS NFR BLD AUTO: 0 % — SIGNIFICANT CHANGE UP (ref 0–2)
BILIRUB SERPL-MCNC: 0.4 MG/DL — SIGNIFICANT CHANGE UP (ref 0.2–1.2)
BUN SERPL-MCNC: 10 MG/DL — SIGNIFICANT CHANGE UP (ref 7–23)
CALCIUM SERPL-MCNC: 9.3 MG/DL — SIGNIFICANT CHANGE UP (ref 8.4–10.5)
CHLORIDE SERPL-SCNC: 104 MMOL/L — SIGNIFICANT CHANGE UP (ref 96–108)
CO2 SERPL-SCNC: 21 MMOL/L — LOW (ref 22–31)
CREAT SERPL-MCNC: 0.66 MG/DL — SIGNIFICANT CHANGE UP (ref 0.5–1.3)
EGFR: 107 ML/MIN/1.73M2 — SIGNIFICANT CHANGE UP
EOSINOPHIL # BLD AUTO: 0 K/UL — SIGNIFICANT CHANGE UP (ref 0–0.5)
EOSINOPHIL NFR BLD AUTO: 0 % — SIGNIFICANT CHANGE UP (ref 0–6)
GLUCOSE SERPL-MCNC: 166 MG/DL — HIGH (ref 70–99)
HCG SERPL-ACNC: <2 MIU/ML — SIGNIFICANT CHANGE UP
LYMPHOCYTES # BLD AUTO: 0.15 K/UL — LOW (ref 1–3.3)
LYMPHOCYTES # BLD AUTO: 2.6 % — LOW (ref 13–44)
MANUAL SMEAR VERIFICATION: SIGNIFICANT CHANGE UP
MONOCYTES # BLD AUTO: 0.1 K/UL — SIGNIFICANT CHANGE UP (ref 0–0.9)
MONOCYTES NFR BLD AUTO: 1.7 % — LOW (ref 2–14)
NEUTROPHILS # BLD AUTO: 5.69 K/UL — SIGNIFICANT CHANGE UP (ref 1.8–7.4)
NEUTROPHILS NFR BLD AUTO: 95.7 % — HIGH (ref 43–77)
PLAT MORPH BLD: NORMAL — SIGNIFICANT CHANGE UP
POTASSIUM SERPL-MCNC: 4.5 MMOL/L — SIGNIFICANT CHANGE UP (ref 3.5–5.3)
POTASSIUM SERPL-SCNC: 4.5 MMOL/L — SIGNIFICANT CHANGE UP (ref 3.5–5.3)
PROT SERPL-MCNC: 6.9 G/DL — SIGNIFICANT CHANGE UP (ref 6–8.3)
RBC BLD AUTO: NORMAL — SIGNIFICANT CHANGE UP
SARS-COV-2 RNA SPEC QL NAA+PROBE: SIGNIFICANT CHANGE UP
SODIUM SERPL-SCNC: 137 MMOL/L — SIGNIFICANT CHANGE UP (ref 135–145)

## 2022-03-26 PROCEDURE — 99223 1ST HOSP IP/OBS HIGH 75: CPT

## 2022-03-26 RX ORDER — CYCLOBENZAPRINE HYDROCHLORIDE 10 MG/1
5 TABLET, FILM COATED ORAL ONCE
Refills: 0 | Status: COMPLETED | OUTPATIENT
Start: 2022-03-26 | End: 2022-03-26

## 2022-03-26 RX ORDER — DIPHENHYDRAMINE HCL 50 MG
25 CAPSULE ORAL EVERY 8 HOURS
Refills: 0 | Status: DISCONTINUED | OUTPATIENT
Start: 2022-03-26 | End: 2022-03-28

## 2022-03-26 RX ORDER — ACETAMINOPHEN 500 MG
650 TABLET ORAL EVERY 6 HOURS
Refills: 0 | Status: DISCONTINUED | OUTPATIENT
Start: 2022-03-26 | End: 2022-03-26

## 2022-03-26 RX ORDER — CYCLOBENZAPRINE HYDROCHLORIDE 10 MG/1
5 TABLET, FILM COATED ORAL THREE TIMES A DAY
Refills: 0 | Status: COMPLETED | OUTPATIENT
Start: 2022-03-26 | End: 2022-03-27

## 2022-03-26 RX ORDER — SODIUM CHLORIDE 9 MG/ML
1000 INJECTION, SOLUTION INTRAVENOUS
Refills: 0 | Status: DISCONTINUED | OUTPATIENT
Start: 2022-03-26 | End: 2022-03-28

## 2022-03-26 RX ORDER — KETOROLAC TROMETHAMINE 30 MG/ML
30 SYRINGE (ML) INJECTION EVERY 8 HOURS
Refills: 0 | Status: DISCONTINUED | OUTPATIENT
Start: 2022-03-26 | End: 2022-03-28

## 2022-03-26 RX ORDER — ENOXAPARIN SODIUM 100 MG/ML
40 INJECTION SUBCUTANEOUS EVERY 24 HOURS
Refills: 0 | Status: DISCONTINUED | OUTPATIENT
Start: 2022-03-26 | End: 2022-03-28

## 2022-03-26 RX ORDER — DIPHENHYDRAMINE HCL 50 MG
25 CAPSULE ORAL EVERY 8 HOURS
Refills: 0 | Status: DISCONTINUED | OUTPATIENT
Start: 2022-03-26 | End: 2022-03-26

## 2022-03-26 RX ORDER — METOCLOPRAMIDE HCL 10 MG
10 TABLET ORAL EVERY 8 HOURS
Refills: 0 | Status: DISCONTINUED | OUTPATIENT
Start: 2022-03-26 | End: 2022-03-28

## 2022-03-26 RX ADMIN — SODIUM CHLORIDE 200 MILLILITER(S): 9 INJECTION, SOLUTION INTRAVENOUS at 15:22

## 2022-03-26 RX ADMIN — ENOXAPARIN SODIUM 40 MILLIGRAM(S): 100 INJECTION SUBCUTANEOUS at 05:01

## 2022-03-26 RX ADMIN — CYCLOBENZAPRINE HYDROCHLORIDE 5 MILLIGRAM(S): 10 TABLET, FILM COATED ORAL at 12:11

## 2022-03-26 RX ADMIN — CYCLOBENZAPRINE HYDROCHLORIDE 5 MILLIGRAM(S): 10 TABLET, FILM COATED ORAL at 21:11

## 2022-03-26 RX ADMIN — Medication 25 MILLIGRAM(S): at 15:22

## 2022-03-26 RX ADMIN — CYCLOBENZAPRINE HYDROCHLORIDE 5 MILLIGRAM(S): 10 TABLET, FILM COATED ORAL at 15:22

## 2022-03-26 RX ADMIN — Medication 1000 MILLIGRAM(S): at 04:02

## 2022-03-26 RX ADMIN — SUMATRIPTAN SUCCINATE 25 MILLIGRAM(S): 4 INJECTION, SOLUTION SUBCUTANEOUS at 04:03

## 2022-03-26 RX ADMIN — Medication 15 MILLIGRAM(S): at 04:02

## 2022-03-26 RX ADMIN — Medication 15 MILLIGRAM(S): at 04:03

## 2022-03-26 RX ADMIN — Medication 650 MILLIGRAM(S): at 05:00

## 2022-03-26 RX ADMIN — SODIUM CHLORIDE 200 MILLILITER(S): 9 INJECTION, SOLUTION INTRAVENOUS at 21:11

## 2022-03-26 NOTE — DISCHARGE NOTE PROVIDER - NSDCCPCAREPLAN_GEN_ALL_CORE_FT
PRINCIPAL DISCHARGE DIAGNOSIS  Diagnosis: Headache  Assessment and Plan of Treatment: Follow up with your Primary Care Physician after discharge.   Follow up with Neurologist Dr. Hilario Arora after discharge. His office is located at [address] [place] [address]. Please call [phone] to schedule this appointment.   Bring a copy of your test results/discharge documents with you to your appointments.  Continue your medications as prescribed.  Monitor your blood pressure. Reduce fat, cholesterol, and salt in your diet. Increase intake of fruits and vegetables. Limit alcohol to a minimum and do not smoke. You may be at risk for falling, make changes to your home to help you walk easier. Keep up to date on vaccinations.  Contact your Neurologist, Primary Care Provider, or report to the Emergency Room if you experience new or worsening symptoms including sudden severe headache, new numbness/tingling, new weakness, and difficulty speaking.       PRINCIPAL DISCHARGE DIAGNOSIS  Diagnosis: Headache  Assessment and Plan of Treatment: Follow up with your Primary Care Physician after discharge.   Follow up with Neurologist Dr. Hilario Arora after discharge. His office is located at [address] [place] [address]. Please call [phone] to schedule this appointment.   Bring a copy of your test results/discharge documents with you to your appointments.  You were started on the following medications:  1. Olanzapine 5MG by mouth every day at bedtime for 5 days. Rarely, side effects of the medication can include an internal feeling of jitteriness/anxiety and/or uncontrollable muscle movements. If you experience these side effects, then stop taking this medication and take OTC Benadryl 50MG PO as this will treat the side effects.  2. Naproxen 500MG by mouth every 12 hours as needed for headache.  3. Tizanidine 2MG by mouth every day at bedtime as needed for headache. Can increase to 4MG by mouth every day at bedtime if needed.  Monitor your blood pressure. Reduce fat, cholesterol, and salt in your diet. Increase intake of fruits and vegetables. Limit alcohol to a minimum and do not smoke. You may be at risk for falling, make changes to your home to help you walk easier. Keep up to date on vaccinations.  Contact your Neurologist, Primary Care Provider, or report to the Emergency Room if you experience new or worsening symptoms including sudden severe headache, new numbness/tingling, new weakness, and difficulty speaking.

## 2022-03-26 NOTE — DISCHARGE NOTE PROVIDER - CARE PROVIDER_API CALL
Cristian Figueroa)  Family Medicine  [address] [place] [address], [facility]. [address]  [place] [address]  Phone: [phone]  Fax: [phone]  Established Patient  Follow Up Time: Routine    Hilario Arora)  Neurology; Pain Medicine; Psychiatry  [address] [facility], [facility] [address]  [place] [address]  Phone: [phone]  Fax: [phone]  Follow Up Time: Routine

## 2022-03-26 NOTE — H&P ADULT - ASSESSMENT
51 y.o. F with PMH migraine (started in 2009) and cervical radiculopathy and disc herniation presented to the ED due to severe headache for 4 days. Neurology consulted for further evaluation. Headache started as 10/10, gradual increase in intensity, R sided then spread to all over the head. Currently b/l frontal with some pressures behind the eyes.  Not have photophobia or phonophobia or positional but moving in general makes worse. Received decadron 10 mg, depakote 500 mg, toradol/migraine/tylenol IV & sumatriptan in ED with minimal improvement. Labs unremarkable. CTH and CTV neg.    Impression: Headache likely due to status migrainosus (lasting > 72 hrs). Ddx include tension headache (gatito with anxiety) vs r/o CVT vs r/o SAH. However other differentials are at low suspicion    Plan:  [] Migraine cocktail as needed with iv toradol, tylenol, magnesium  [] Can do trial of flexeril 5 mg qhs or tizanidine 4 mg q6 prn for generalized pain and neck pain  [] Neurocheck and vital per unit protocol  [] Patient can follow up with headache specialist at [address] [place] [address] 1-2 weeks after discharge. Please instruct the patient to call [phone] to schedule this appointment.     Case discussed with general neurology attending Dr. Bhatti. Case to be seen in AM 51 y.o. F with PMH migraine (started in 2009) and cervical radiculopathy and disc herniation presented to the ED due to severe headache for 4 days. Neurology consulted for further evaluation. Headache started as 10/10, gradual increase in intensity, R sided then spread to all over the head. Currently b/l frontal with some pressures behind the eyes.  Not have photophobia or phonophobia or positional but moving in general makes worse. Received decadron 10 mg, depakote 500 mg, toradol/migraine/tylenol IV & sumatriptan in ED with minimal improvement. Labs unremarkable. CTH and CTV neg.    Impression: Headache likely due to status migrainosus (lasting > 72 hrs). Ddx include tension headache (gatito with anxiety) vs r/o CVT vs r/o SAH. However other differentials are at low suspicion    Plan:  [] Will do trial of around the clock tylenol  [] Can do trial of flexeril 5 mg qhs or tizanidine 4 mg q6 prn for generalized pain and neck pain  [] Migraine cocktail as needed with iv toradol, tylenol, magnesium prn  [] Neurocheck and vital per unit protocol  [] Patient can follow up with headache specialist at [address] [place] [address] 1-2 weeks after discharge. Please instruct the patient to call [phone] to schedule this appointment.     Case discussed with general neurology attending Dr. Bhatti. Case to be seen in AM 51 y.o. F with PMH migraine (started in 2009) and cervical radiculopathy and disc herniation presented to the ED due to severe headache for 4 days. Neurology consulted for further evaluation. Headache started as 10/10, gradual increase in intensity, R sided then spread to all over the head. Currently b/l frontal with some pressures behind the eyes.  Not have photophobia or phonophobia or positional but moving in general makes worse. Received decadron 10 mg, depakote 500 mg, toradol/migraine/tylenol IV & sumatriptan in ED with minimal improvement. Labs unremarkable. CTH and CTV neg.    Impression: Headache likely due to status migrainosus (lasting > 72 hrs). Ddx include tension headache (gatito with anxiety) vs r/o CVT vs r/o SAH. However other differentials are at low suspicion    Plan:  [] Will do trial of around the clock tylenol  [] Can do trial of flexeril 5 mg qhs or tizanidine 4 mg q6 prn for generalized pain and neck pain. For now pt would like to rest  [] Migraine cocktail as needed with iv toradol, tylenol, magnesium prn  [] Neurocheck and vital per unit protocol  [] Patient can follow up with headache specialist at [address] [place] [address] 1-2 weeks after discharge. Please instruct the patient to call [phone] to schedule this appointment.     Case discussed with general neurology attending Dr. Bhatti. Case to be seen in AM

## 2022-03-26 NOTE — DISCHARGE NOTE PROVIDER - NSDCMRMEDTOKEN_GEN_ALL_CORE_FT
Lexapro 5 mg oral tablet: 1 tab(s) orally once a day  naproxen 500 mg oral tablet: 1 tab(s) orally every 12 hours, As Needed -for headache   OLANZapine 5 mg oral tablet: 1 tab(s) orally once a day (at bedtime)   tiZANidine 2 mg oral tablet: 1 tab(s) orally 2 times a day, As Needed -for headache

## 2022-03-26 NOTE — H&P ADULT - NSHPPHYSICALEXAM_GEN_ALL_CORE
General:  Constitutional: Obese Female, appears stated age, in no apparent distress including pain  Head: Normocephalic & atraumatic.    Neurological (>12):  MS: Awake, alert, oriented to person, place, situation, time. Normal affect. Follows all commands.    Language: Speech is clear, fluent with good repetition & comprehension    CNs: PERRL (R = 3mm, L = 3mm). VFF. EOMI no nystagmus, no diplopia. V1-3 intact to LT but reports sporadic numbness on L face, well developed masseter muscles b/l. No facial asymmetry b/l, full eye closure strength b/l. Hearing grossly normal (rubbing fingers) b/l. Head turning & shoulder shrug intact b/l. Tongue midline, normal movements, no atrophy.    Motor: Normal muscle bulk & tone. No noticeable tremor. No pronator drift. All extremities are antigravity without drift. Strength exam limited due to give ups.     Sensation: Intact to LT b/l throughout. Sporadic numbness on R arm and tingling on L finger tips    Cortical: Extinction on DSS (neglect): none    Reflexes:              Biceps(C5)       BR(C6)     Patellar(L4)    Achilles(S1)    Plantar Resp  R	2	          2	             2		    2		Down   L	2	          2	             2		    2		Down     Coordination: intact rapid-alt movements. No dysmetria to FTN    Gait: Deferred

## 2022-03-26 NOTE — DISCHARGE NOTE PROVIDER - ATTENDING DISCHARGE PHYSICAL EXAMINATION:
Headache and neck pain is improved. For d/c today.    MS - AAO x3, speech fluent, rep/naming intact, follows commands. Attn/conc, recent and remote memory, fund of knowledge WNL  CN - PERRLA, EOMI, VFF, face sens/str/hearing intact b/l, tongue/palate midline, trap 5/5 b/l  Motor - Normal bulk/tone, 5/5 all  Sens - LT/temp intact all  DTR's - 2+ all, downgoing b/l plantar response  Coord - FtN intact b/l  Gait and station - Normal casual gait. Romberg (-)    A/P:  Status migrainosus  Cervicalgia  Anxiety    - Status migrainosus, now much improved on current regimen. No focal neurologic symptoms or other concerning signs by history or exam  - Will transition to outpatient medication regimen with olanzapine 5mg PO bedtime for 5 days, Benadryl OTC 25mg PO bedtime prn akathisia/dystonia, naproxen 500mg PO BID prn headache for 14 days, tizanidine 2mg PO BID prn neck pain for 14 days  - No neurologic barriers to discharge today

## 2022-03-26 NOTE — DISCHARGE NOTE PROVIDER - PROVIDER TOKENS
PROVIDER:[TOKEN:[[id number]],FOLLOWUP:[Routine],ESTABLISHEDPATIENT:[T]],PROVIDER:[TOKEN:[[id number]],FOLLOWUP:[Routine]]

## 2022-03-26 NOTE — ED ADULT NURSE REASSESSMENT NOTE - NS ED NURSE REASSESS COMMENT FT1
RN called floor for report, RN unable to take report at this time, RN instructed to call RN for report when available,

## 2022-03-26 NOTE — PATIENT PROFILE ADULT - FALL HARM RISK - HARM RISK INTERVENTIONS
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
## 2022-03-26 NOTE — H&P ADULT - ATTENDING COMMENTS
I have examined the pt at bedside.  The risks and the benefits of the proposed diagnostic/therapeutic approach were thoroughly discussed.   I agree with the above plan and have modified it where necessary.    52yo RH WW presenting with migraine, with poor response to medical treatment so far.  Pt has a PMH of migraine, treated with Excedrin in the past, and at least 2-3 severe monthly episodes, never treated with preventive strategy.  Will consider DHE now I HA does not improve.   Will refer to Dr. Arora as outpt for management at discharge.   Neuroscience at [address] [place] [address], [address] [place], [phone].
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
## 2022-03-27 LAB
ANION GAP SERPL CALC-SCNC: 10 MMOL/L — SIGNIFICANT CHANGE UP (ref 5–17)
BUN SERPL-MCNC: 14 MG/DL — SIGNIFICANT CHANGE UP (ref 7–23)
CALCIUM SERPL-MCNC: 8.6 MG/DL — SIGNIFICANT CHANGE UP (ref 8.4–10.5)
CHLORIDE SERPL-SCNC: 107 MMOL/L — SIGNIFICANT CHANGE UP (ref 96–108)
CO2 SERPL-SCNC: 24 MMOL/L — SIGNIFICANT CHANGE UP (ref 22–31)
CREAT SERPL-MCNC: 0.81 MG/DL — SIGNIFICANT CHANGE UP (ref 0.5–1.3)
EGFR: 88 ML/MIN/1.73M2 — SIGNIFICANT CHANGE UP
FOLATE SERPL-MCNC: 9.1 NG/ML — SIGNIFICANT CHANGE UP
GLUCOSE SERPL-MCNC: 83 MG/DL — SIGNIFICANT CHANGE UP (ref 70–99)
HCT VFR BLD CALC: 31.8 % — LOW (ref 34.5–45)
HGB BLD-MCNC: 9.8 G/DL — LOW (ref 11.5–15.5)
MAGNESIUM SERPL-MCNC: 1.9 MG/DL — SIGNIFICANT CHANGE UP (ref 1.6–2.6)
MCHC RBC-ENTMCNC: 26.6 PG — LOW (ref 27–34)
MCHC RBC-ENTMCNC: 30.8 GM/DL — LOW (ref 32–36)
MCV RBC AUTO: 86.4 FL — SIGNIFICANT CHANGE UP (ref 80–100)
NRBC # BLD: 0 /100 WBCS — SIGNIFICANT CHANGE UP (ref 0–0)
NT-PROBNP SERPL-SCNC: 335 PG/ML — HIGH (ref 0–300)
PHOSPHATE SERPL-MCNC: 3.1 MG/DL — SIGNIFICANT CHANGE UP (ref 2.5–4.5)
PLATELET # BLD AUTO: 145 K/UL — LOW (ref 150–400)
POTASSIUM SERPL-MCNC: 4.2 MMOL/L — SIGNIFICANT CHANGE UP (ref 3.5–5.3)
POTASSIUM SERPL-SCNC: 4.2 MMOL/L — SIGNIFICANT CHANGE UP (ref 3.5–5.3)
RBC # BLD: 3.68 M/UL — LOW (ref 3.8–5.2)
RBC # FLD: 13.7 % — SIGNIFICANT CHANGE UP (ref 10.3–14.5)
SODIUM SERPL-SCNC: 141 MMOL/L — SIGNIFICANT CHANGE UP (ref 135–145)
TSH SERPL-MCNC: 8.17 UIU/ML — HIGH (ref 0.27–4.2)
VIT B12 SERPL-MCNC: 370 PG/ML — SIGNIFICANT CHANGE UP (ref 232–1245)
WBC # BLD: 4.57 K/UL — SIGNIFICANT CHANGE UP (ref 3.8–10.5)
WBC # FLD AUTO: 4.57 K/UL — SIGNIFICANT CHANGE UP (ref 3.8–10.5)

## 2022-03-27 PROCEDURE — 99233 SBSQ HOSP IP/OBS HIGH 50: CPT

## 2022-03-27 RX ORDER — KETOROLAC TROMETHAMINE 30 MG/ML
30 SYRINGE (ML) INJECTION ONCE
Refills: 0 | Status: DISCONTINUED | OUTPATIENT
Start: 2022-03-27 | End: 2022-03-27

## 2022-03-27 RX ORDER — HYDROCORTISONE 20 MG
250 TABLET ORAL ONCE
Refills: 0 | Status: COMPLETED | OUTPATIENT
Start: 2022-03-27 | End: 2022-03-27

## 2022-03-27 RX ORDER — ESCITALOPRAM OXALATE 10 MG/1
5 TABLET, FILM COATED ORAL DAILY
Refills: 0 | Status: DISCONTINUED | OUTPATIENT
Start: 2022-03-27 | End: 2022-03-28

## 2022-03-27 RX ORDER — MAGNESIUM SULFATE 500 MG/ML
2 VIAL (ML) INJECTION ONCE
Refills: 0 | Status: COMPLETED | OUTPATIENT
Start: 2022-03-27 | End: 2022-03-27

## 2022-03-27 RX ADMIN — SODIUM CHLORIDE 200 MILLILITER(S): 9 INJECTION, SOLUTION INTRAVENOUS at 03:38

## 2022-03-27 RX ADMIN — ENOXAPARIN SODIUM 40 MILLIGRAM(S): 100 INJECTION SUBCUTANEOUS at 05:15

## 2022-03-27 RX ADMIN — Medication 30 MILLIGRAM(S): at 13:04

## 2022-03-27 RX ADMIN — Medication 30 MILLIGRAM(S): at 19:50

## 2022-03-27 RX ADMIN — Medication 250 MILLIGRAM(S): at 12:12

## 2022-03-27 RX ADMIN — Medication 25 MILLIGRAM(S): at 19:49

## 2022-03-27 RX ADMIN — Medication 30 MILLIGRAM(S): at 20:20

## 2022-03-27 RX ADMIN — Medication 30 MILLIGRAM(S): at 04:07

## 2022-03-27 RX ADMIN — ESCITALOPRAM OXALATE 5 MILLIGRAM(S): 10 TABLET, FILM COATED ORAL at 17:32

## 2022-03-27 RX ADMIN — Medication 30 MILLIGRAM(S): at 12:12

## 2022-03-27 RX ADMIN — CYCLOBENZAPRINE HYDROCHLORIDE 5 MILLIGRAM(S): 10 TABLET, FILM COATED ORAL at 05:14

## 2022-03-27 RX ADMIN — Medication 10 MILLIGRAM(S): at 19:50

## 2022-03-27 RX ADMIN — Medication 25 GRAM(S): at 12:11

## 2022-03-27 RX ADMIN — Medication 10 MILLIGRAM(S): at 03:39

## 2022-03-27 RX ADMIN — Medication 30 MILLIGRAM(S): at 03:37

## 2022-03-27 RX ADMIN — Medication 25 MILLIGRAM(S): at 03:37

## 2022-03-27 NOTE — PROGRESS NOTE ADULT - ATTENDING COMMENTS
I have examined the pt at bedside.  The risks and the benefits of the proposed diagnostic/therapeutic approach were thoroughly discussed.   I agree with the above plan and have modified it where necessary.    52yo RH WW presenting with migraine, with poor response to medical treatment so far.  Pt has a PMH of migraine, treated with Excedrin in the past, and at least 2-3 severe monthly episodes, never treated with preventive strategy.  Will consider DHE now I HA does not improve.     Partial improvement with Flexeril, reducing the neck tension and sleeping better.  Will try Solu-Medrol today and try to discharge pt tonight if she feels well or tomorrow.    Will refer to Dr. Arora or [place] as outpt for management at discharge.   Neuroscience at [address] [facility], [place], [address] [place], [phone].

## 2022-03-27 NOTE — PROGRESS NOTE ADULT - SUBJECTIVE AND OBJECTIVE BOX
Neurology Progress Note    Interval History - No events overnight    Subjective:    Objective:   Vital Signs Last 24 Hrs  T(C): 36.4 (27 Mar 2022 05:28), Max: 37.5 (26 Mar 2022 18:01)  T(F): 97.6 (27 Mar 2022 05:28), Max: 99.5 (26 Mar 2022 18:01)  HR: 73 (27 Mar 2022 05:28) (59 - 94)  BP: 100/62 (27 Mar 2022 05:28) (100/62 - 112/62)  BP(mean): --  RR: 18 (27 Mar 2022 05:28) (17 - 18)  SpO2: 97% (27 Mar 2022 05:28) (97% - 99%)    General Exam:   PHYSICAL EXAM:  GENERAL: NAD  HEENT: Normocephalic;  conjunctivae and sclerae clear; moist mucous membranes;   NECK: Supple  EXTREMITIES: No cyanosis; no edema; no calf tenderness  SKIN: Warm and dry; no rash    NEURO EXAM:  - Mental Status: Alert, oriented to self, place, situation; no dysarthria, speech is fluent with intact naming, repetition, and comprehension  - Cranial Nerves II-XII:    II:  PERRL 3mm b/l; visual fields are full to confrontation  III, IV, VI:  EOMI, no nystagmus  V:  facial sensation is intact in the V1-V3 distribution bilaterally.  VII:  face is symmetric with normal eye closure and smile  VIII:  hearing is intact to finger rub  IX, X:  uvula is midline and soft palate rises symmetrically  XI:  head turning and shoulder shrug are intact bilaterally  XII:  tongue protrudes in the midline  - Motor:  strength is 5/5 throughout; normal muscle bulk and tone throughout; no pronator drift  - Reflexes:  2+ and symmetric at the biceps, triceps, brachioradialis, knees, and ankles;  plantar reflexes downgoing bilaterally  - Sensory:  intact to light touch and symmetric throughout  - Coordination:  finger-nose-finger and heel-knee-shin intact without dysmetria  - Gait:  normal steps, base, arm swing, and turning; heel and toe walking are normal; tandem gait is normal; Romberg testing is negative    Other:    03-27    141  |  107  |  14  ----------------------------<  83  4.2   |  24  |  0.81    Ca    8.6      27 Mar 2022 06:54  Phos  3.1     03-27  Mg     1.9     03-27    TPro  6.9  /  Alb  4.1  /  TBili  0.4  /  DBili  x   /  AST  27  /  ALT  19  /  AlkPhos  74  03-25    LIVER FUNCTIONS - ( 25 Mar 2022 23:45 )  Alb: 4.1 g/dL / Pro: 6.9 g/dL / ALK PHOS: 74 U/L / ALT: 19 U/L / AST: 27 U/L / GGT: x                                 9.8    4.57  )-----------( 145      ( 27 Mar 2022 06:55 )             31.8     Radiology    CT Head No Cont (03.25.22 @ 23:57) >  IMPRESSION: No evidence of venous sinus thrombosis.    MEDICATIONS  (STANDING):  enoxaparin Injectable 40 milliGRAM(s) SubCutaneous every 24 hours  lactated ringers. 1000 milliLiter(s) (200 mL/Hr) IV Continuous <Continuous>    MEDICATIONS  (PRN):  diphenhydrAMINE Injectable 25 milliGRAM(s) IV Push every 8 hours PRN Headache  ketorolac   Injectable 30 milliGRAM(s) IV Push every 8 hours PRN Headache  metoclopramide Injectable 10 milliGRAM(s) IV Push every 8 hours PRN Headache            Neurology Progress Note    Interval History - No events overnight    Subjective:  Patient was seen and examined at bedside. She continues to have some photosensitivity and pressure on the left side of her head. She took Tylenol overnight with no relief.    Objective:   Vital Signs Last 24 Hrs  T(C): 36.4 (27 Mar 2022 05:28), Max: 37.5 (26 Mar 2022 18:01)  T(F): 97.6 (27 Mar 2022 05:28), Max: 99.5 (26 Mar 2022 18:01)  HR: 73 (27 Mar 2022 05:28) (59 - 94)  BP: 100/62 (27 Mar 2022 05:28) (100/62 - 112/62)  BP(mean): --  RR: 18 (27 Mar 2022 05:28) (17 - 18)  SpO2: 97% (27 Mar 2022 05:28) (97% - 99%)    General Exam:   PHYSICAL EXAM:  GENERAL: NAD  HEENT: Normocephalic;  conjunctivae and sclerae clear; moist mucous membranes;   NECK: Supple  EXTREMITIES: No cyanosis; no edema; no calf tenderness  SKIN: Warm and dry; no rash    NEURO EXAM:  - Mental Status: Alert, oriented to self, place, situation; no dysarthria, speech is fluent with intact naming, repetition, and comprehension  - Cranial Nerves II-XII:    II:  PERRL 3mm b/l; visual fields are full to confrontation  III, IV, VI:  EOMI, no nystagmus  V:  facial sensation is intact in the V1-V3 distribution bilaterally.  VII:  face is symmetric with normal eye closure and smile  VIII:  hearing is intact to finger rub  IX, X:  uvula is midline and soft palate rises symmetrically  XI:  head turning and shoulder shrug are intact bilaterally  XII:  tongue protrudes in the midline  - Motor:  strength is 5/5 throughout; normal muscle bulk and tone throughout; no pronator drift  - Reflexes:  2+ and symmetric at the biceps, triceps, brachioradialis, knees, and ankles;  plantar reflexes downgoing bilaterally  - Sensory:  intact to light touch and symmetric throughout  - Coordination:  finger-nose-finger and heel-knee-shin intact without dysmetria  - Gait:  normal steps, base, arm swing, and turning; heel and toe walking are normal; Romberg testing is negative    Other:    03-27    141  |  107  |  14  ----------------------------<  83  4.2   |  24  |  0.81    Ca    8.6      27 Mar 2022 06:54  Phos  3.1     03-27  Mg     1.9     03-27    TPro  6.9  /  Alb  4.1  /  TBili  0.4  /  DBili  x   /  AST  27  /  ALT  19  /  AlkPhos  74  03-25    LIVER FUNCTIONS - ( 25 Mar 2022 23:45 )  Alb: 4.1 g/dL / Pro: 6.9 g/dL / ALK PHOS: 74 U/L / ALT: 19 U/L / AST: 27 U/L / GGT: x                                 9.8    4.57  )-----------( 145      ( 27 Mar 2022 06:55 )             31.8     Radiology    CT Head No Cont (03.25.22 @ 23:57) >  IMPRESSION: No evidence of venous sinus thrombosis.    MEDICATIONS  (STANDING):  enoxaparin Injectable 40 milliGRAM(s) SubCutaneous every 24 hours  lactated ringers. 1000 milliLiter(s) (200 mL/Hr) IV Continuous <Continuous>    MEDICATIONS  (PRN):  diphenhydrAMINE Injectable 25 milliGRAM(s) IV Push every 8 hours PRN Headache  ketorolac   Injectable 30 milliGRAM(s) IV Push every 8 hours PRN Headache  metoclopramide Injectable 10 milliGRAM(s) IV Push every 8 hours PRN Headache

## 2022-03-27 NOTE — PROGRESS NOTE ADULT - ASSESSMENT
51 y.o. F with PMH migraine (started in 2009) and cervical radiculopathy and disc herniation presented to the ED due to severe headache for 4 days. Neurology consulted for further evaluation. Headache started as 10/10, gradual increase in intensity, R sided then spread to all over the head. Currently b/l frontal with some pressures behind the eyes.  Not have photophobia or phonophobia or positional but moving in general makes worse. Received decadron 10 mg, depakote 500 mg, toradol/migraine/tylenol IV & sumatriptan in ED with minimal improvement. Labs unremarkable. CTH and CTV neg.    Impression: Headache likely due to status migrainosus (lasting > 72 hrs). Ddx include tension headache (gatito with anxiety) vs r/o CVT vs r/o SAH. However other differentials are at low suspicion    Plan:  [] Continue HA cocktail w/ Toradol 30mg Q8H, Mg 2g, Reglan 20mg Q8H PRN  [] Can do trial of flexeril 5 mg qhs or tizanidine 4 mg q6 prn for generalized pain and neck pain. For now pt would like to rest  [] Neurocheck and vital per unit protocol  [] Patient can follow up with headache specialist at [address] [place] [address] 1-2 weeks after discharge. Please instruct the patient to call [phone] to schedule this appointment.     Case discussed with general neurology attending Dr. Bhatti. 51 y.o. F with PMH migraine (started in 2009) and cervical radiculopathy and disc herniation presented to the ED due to severe headache for 4 days. Neurology consulted for further evaluation. Headache started as 10/10, gradual increase in intensity, R sided then spread to all over the head. Currently b/l frontal with some pressures behind the eyes.  Not have photophobia or phonophobia or positional but moving in general makes worse. Received decadron 10 mg, depakote 500 mg, toradol/migraine/tylenol IV & sumatriptan in ED with minimal improvement. Labs unremarkable. CTH and CTV neg.    Impression: Headache likely due to status migrainosus (lasting > 72 hrs). Ddx include tension headache (gatito with anxiety) vs r/o CVT vs r/o SAH. However other differentials are at low suspicion    Plan:  [] Trial of Solumedrol 250mg IVx1  [] Continue HA cocktail w/ Toradol 30mg Q8H, Mg 2g, Reglan 20mg Q8H PRN  [] Can do trial of flexeril 5 mg qhs or tizanidine 4 mg q6 prn for generalized pain and neck pain. For now pt would like to rest  [] Neurocheck and vital per unit protocol  [] Patient can follow up with headache specialist, either Dr. Hilario Arora or Dr. Andrea Villasenor at [address] [place] [address] 1-2 weeks after discharge. Please instruct the patient to call [phone] to schedule this appointment.     Case discussed with general neurology attending Dr. Bhatti. 51 y.o. F with PMH migraine (started in 2009) and cervical radiculopathy and disc herniation presented to the ED due to severe headache for 4 days. Neurology consulted for further evaluation. Headache started as 10/10, gradual increase in intensity, R sided then spread to all over the head. Currently b/l frontal with some pressures behind the eyes.  Not have photophobia or phonophobia or positional but moving in general makes worse. Received decadron 10 mg, depakote 500 mg, toradol/migraine/tylenol IV & sumatriptan in ED with minimal improvement. Labs unremarkable. CTH and CTV neg.    Impression: Headache likely due to status migrainosus (lasting > 72 hrs). Ddx include tension headache (gatito with anxiety) vs r/o CVT vs r/o SAH. However other differentials are at low suspicion    Plan:  [] Trial of hydrocortisone 250mg IVPx1  [] Continue HA cocktail w/ Toradol 30mg Q8H, Mg 2g, Reglan 20mg Q8H PRN  [] Can do trial of flexeril 5 mg qhs or tizanidine 4 mg q6 prn for generalized pain and neck pain. For now pt would like to rest  [] Neurocheck and vital per unit protocol  [] Patient can follow up with headache specialist, either Dr. Hilario Arora or Dr. Andrea Villasenor at [address] [place] [address] 1-2 weeks after discharge. Please instruct the patient to call [phone] to schedule this appointment.     Case discussed with general neurology attending Dr. Bhatti. 51 y.o. F with PMH migraine (started in 2009) and cervical radiculopathy and disc herniation presented to the ED due to severe headache for 4 days. Neurology consulted for further evaluation. Headache started as 10/10, gradual increase in intensity, R sided then spread to all over the head. Currently b/l frontal with some pressures behind the eyes.  Not have photophobia or phonophobia or positional but moving in general makes worse. Received decadron 10 mg, depakote 500 mg, toradol/migraine/tylenol IV & sumatriptan in ED with minimal improvement. Labs unremarkable. CTH and CTV neg.    Impression: Headache likely due to status migrainosus (lasting > 72 hrs). Ddx include tension headache (gatito with anxiety) vs r/o CVT vs r/o SAH. However other differentials are at low suspicion    Plan:  [] Start Lexapro 5MG PO  [] Trial of hydrocortisone 250mg IVPx1  [] Continue HA cocktail w/ Toradol 30mg Q8H, Mg 2g, Reglan 20mg Q8H PRN  [] Can do trial of flexeril 5 mg qhs or tizanidine 4 mg q6 prn for generalized pain and neck pain. For now pt would like to rest  [] Neurocheck and vital per unit protocol  [] Patient can follow up with headache specialist, either Dr. Hilario Arora or Dr. Andrea Villasenor at [address] [place] [address] 1-2 weeks after discharge. Please instruct the patient to call [phone] to schedule this appointment.     Case discussed with general neurology attending Dr. Bhatti.

## 2022-03-27 NOTE — PROGRESS NOTE ADULT - REASON FOR ADMISSION
chest pain 2 hours prior to arrival on left upper chest, point tender, no radiation, diaphoresis, low risk heart score, perc neg migraine chest pain 2 hours prior to arrival on left upper chest, no radiation, diaphoresis, low risk heart score, perc neg. however ekg with lateral and inferior TWI and pt states cp not completely subsided, advised admission even after discussion with cards attending on call, advised no flight travel either, stated would still like to leave despite rec to stay for further cardiac eval.     Discussed with pt results of work up, strict return precautions, and need for follow up.  Pt expressed understanding and agrees with plan.

## 2022-03-28 ENCOUNTER — TRANSCRIPTION ENCOUNTER (OUTPATIENT)
Age: 52
End: 2022-03-28

## 2022-03-28 VITALS
OXYGEN SATURATION: 98 % | TEMPERATURE: 98 F | HEART RATE: 78 BPM | RESPIRATION RATE: 18 BRPM | SYSTOLIC BLOOD PRESSURE: 101 MMHG | DIASTOLIC BLOOD PRESSURE: 68 MMHG

## 2022-03-28 PROCEDURE — 84100 ASSAY OF PHOSPHORUS: CPT

## 2022-03-28 PROCEDURE — 99239 HOSP IP/OBS DSCHRG MGMT >30: CPT | Mod: GC

## 2022-03-28 PROCEDURE — 96375 TX/PRO/DX INJ NEW DRUG ADDON: CPT

## 2022-03-28 PROCEDURE — 82607 VITAMIN B-12: CPT

## 2022-03-28 PROCEDURE — 85025 COMPLETE CBC W/AUTO DIFF WBC: CPT

## 2022-03-28 PROCEDURE — 82746 ASSAY OF FOLIC ACID SERUM: CPT

## 2022-03-28 PROCEDURE — 99285 EMERGENCY DEPT VISIT HI MDM: CPT

## 2022-03-28 PROCEDURE — 87635 SARS-COV-2 COVID-19 AMP PRB: CPT

## 2022-03-28 PROCEDURE — 80048 BASIC METABOLIC PNL TOTAL CA: CPT

## 2022-03-28 PROCEDURE — 84443 ASSAY THYROID STIM HORMONE: CPT

## 2022-03-28 PROCEDURE — 96376 TX/PRO/DX INJ SAME DRUG ADON: CPT

## 2022-03-28 PROCEDURE — 83880 ASSAY OF NATRIURETIC PEPTIDE: CPT

## 2022-03-28 PROCEDURE — 84702 CHORIONIC GONADOTROPIN TEST: CPT

## 2022-03-28 PROCEDURE — 96374 THER/PROPH/DIAG INJ IV PUSH: CPT

## 2022-03-28 PROCEDURE — 80053 COMPREHEN METABOLIC PANEL: CPT

## 2022-03-28 PROCEDURE — 83735 ASSAY OF MAGNESIUM: CPT

## 2022-03-28 PROCEDURE — 70496 CT ANGIOGRAPHY HEAD: CPT | Mod: MA

## 2022-03-28 PROCEDURE — 85027 COMPLETE CBC AUTOMATED: CPT

## 2022-03-28 PROCEDURE — 70450 CT HEAD/BRAIN W/O DYE: CPT | Mod: MA

## 2022-03-28 PROCEDURE — 36415 COLL VENOUS BLD VENIPUNCTURE: CPT

## 2022-03-28 RX ORDER — ESCITALOPRAM OXALATE 10 MG/1
1 TABLET, FILM COATED ORAL
Qty: 30 | Refills: 0
Start: 2022-03-28 | End: 2022-04-26

## 2022-03-28 RX ORDER — OLANZAPINE 15 MG/1
1 TABLET, FILM COATED ORAL
Qty: 5 | Refills: 0
Start: 2022-03-28 | End: 2022-04-01

## 2022-03-28 RX ORDER — LANOLIN ALCOHOL/MO/W.PET/CERES
5 CREAM (GRAM) TOPICAL ONCE
Refills: 0 | Status: COMPLETED | OUTPATIENT
Start: 2022-03-28 | End: 2022-03-28

## 2022-03-28 RX ORDER — TIZANIDINE 4 MG/1
1 TABLET ORAL
Qty: 28 | Refills: 0
Start: 2022-03-28 | End: 2022-04-10

## 2022-03-28 RX ADMIN — ENOXAPARIN SODIUM 40 MILLIGRAM(S): 100 INJECTION SUBCUTANEOUS at 05:49

## 2022-03-28 RX ADMIN — Medication 25 MILLIGRAM(S): at 05:49

## 2022-03-28 RX ADMIN — Medication 10 MILLIGRAM(S): at 05:49

## 2022-03-28 RX ADMIN — ESCITALOPRAM OXALATE 5 MILLIGRAM(S): 10 TABLET, FILM COATED ORAL at 11:34

## 2022-03-28 RX ADMIN — Medication 5 MILLIGRAM(S): at 01:04

## 2022-03-28 RX ADMIN — Medication 30 MILLIGRAM(S): at 05:49

## 2022-03-28 NOTE — DISCHARGE NOTE NURSING/CASE MANAGEMENT/SOCIAL WORK - PATIENT PORTAL LINK FT
You can access the FollowMyHealth Patient Portal offered by [facility] by registering at the following website: [email or web address]. By joining [employer]’s FollowMyHealth portal, you will also be able to view your health information using other applications (apps) compatible with our system.

## 2022-03-28 NOTE — DISCHARGE NOTE NURSING/CASE MANAGEMENT/SOCIAL WORK - NSDCPEFALRISK_GEN_ALL_CORE
For information on Fall & Injury Prevention, visit: [email or web address] OR  [email or web address] OR  https://www.cdc.gov/steadi/patient.html

## 2022-03-28 NOTE — PROGRESS NOTE ADULT - ATTENDING COMMENTS
Interval history as per resident note, personally verified by me. Headache and neck pain is improved. For likely d/c later today.    MS - AAO x3, speech fluent, rep/naming intact, follows commands. Attn/conc, recent and remote memory, fund of knowledge WNL  CN - PERRLA, EOMI, VFF, face sens/str/hearing intact b/l, tongue/palate midline, trap 5/5 b/l  Motor - Normal bulk/tone, 5/5 all  Sens - LT/temp intact all  DTR's - 2+ all, downgoing b/l plantar response  Coord - FtN intact b/l  Gait and station - Normal casual gait. Romberg (-)    A/P:  Status migrainosus  Cervicalgia  Anxiety    - Status migrainosus, now much improved on current regimen. No focal neurologic symptoms or other concerning signs by history or exam  - Will transition to outpatient medication regimen with olanzapine 5mg PO bedtime for 5 days, Benadryl OTC 25mg PO bedtime prn akathisia/dystonia, naproxen 500mg PO BID prn headache for 14 days, tizanidine 2mg PO BID prn neck pain for 14 days  - No neurologic barriers to discharge today  - Remainder as per resident note

## 2022-03-28 NOTE — PROGRESS NOTE ADULT - ASSESSMENT
Assessment: 50 yo female with a PMHx of migraines (started in 2009), cervical radiculopathy, and disc herniation who presented to the ED on 3/25 due to severe headache for 4 days. Neurology consulted for further evaluation. Headache started as 10/10, gradual increase in intensity, R sided then spread to all over the head. Denies photophobia/phonophobia but moving in general makes worse. CTH and CTV Head unremarkable.    Impression: Status migrainosus (lasting > 72 hrs).    Plan:  [] Neuro checks/vitals Q4HR.  [] c/w Lexapro 5MG PO QD.  [x] s/p trial of Flexeril 5MG PO Q8HR x 24 hours.  [x] s/p trial of Hydrocortisone 250MG IVP x1.  [] Continue HA cocktail Q8HR PRN: Toradol 30MG IVP, Reglan 10MG IVP, Benadryl 25MG IVP.  [] DVT PPx: Lovenox 40MG SC QD.  [] Diet: Regular.  [] Patient can follow up with headache specialist Dr. Hilario Arora at [address] [place] [address] after discharge. Patient will need to call [phone] to schedule this appointment.     Case seen and discussed with neurology attending Dr. Blevins. Assessment: 52 yo female with a PMHx of migraines (started in 2009), cervical radiculopathy, and disc herniation who presented to the ED on 3/25 due to severe headache for 4 days. Neurology consulted for further evaluation. Headache started as 10/10, gradual increase in intensity, R sided then spread to all over the head. Denies photophobia/phonophobia but moving in general makes worse. CTH and CTV Head unremarkable.    Impression: Status migrainosus (lasting > 72 hrs).    Plan:  [] Neuro checks/vitals Q4HR.  [] c/w Lexapro 5MG PO QD.  [x] s/p trial of Flexeril 5MG PO Q8HR x 24 hours.  [x] s/p trial of Hydrocortisone 250MG IVP x1.  [] To be discharged with: Olanzapine 5MG PO QHS x5 days, Naproxen 500MG PO Q12HR PRN x2 weeks, and Tizanidine 2MG PO BID x2 weeks.  [] DVT PPx: Lovenox 40MG SC QD.  [] Diet: Regular.  [] Patient can follow up with headache specialist Dr. Hilario Arora at [address] [place] [address] after discharge. Patient will need to call [phone] to schedule this appointment.     Case seen and discussed with neurology attending Dr. Blevins.

## 2022-03-28 NOTE — PROGRESS NOTE ADULT - SUBJECTIVE AND OBJECTIVE BOX
MRN-[id number]    Subjective: 52 yo female seen and examined at bedside. No events overnight.    PAST MEDICAL & SURGICAL HISTORY:  Migraines    No significant past surgical history    S/P tubal ligation    Social Hx:  Nonsmoker, no drug or alcohol use    MEDICATIONS  (STANDING):  enoxaparin Injectable 40 milliGRAM(s) SubCutaneous every 24 hours  escitalopram 5 milliGRAM(s) Oral daily    MEDICATIONS  (PRN):  diphenhydrAMINE Injectable 25 milliGRAM(s) IV Push every 8 hours PRN Headache  ketorolac   Injectable 30 milliGRAM(s) IV Push every 8 hours PRN Headache  metoclopramide Injectable 10 milliGRAM(s) IV Push every 8 hours PRN Headache    Allergies  cinnamon (Unknown)  penicillin (Hives)    ROS: Pertinent positives above, all other ROS were reviewed and are negative.      Vital Signs Last 24 Hrs  T(C): 36.5 (28 Mar 2022 05:43), Max: 36.8 (27 Mar 2022 17:54)  T(F): 97.7 (28 Mar 2022 05:43), Max: 98.3 (27 Mar 2022 17:54)  HR: 78 (28 Mar 2022 05:43) (66 - 86)  BP: 108/67 (28 Mar 2022 05:43) (97/61 - 117/73)  RR: 18 (28 Mar 2022 05:43) (18 - 18)  SpO2: 99% (28 Mar 2022 05:43) (97% - 99%)    GENERAL EXAM:  Constitutional:   Head:  Extremities:    NEUROLOGICAL EXAM:    Labs:   cbc                      9.8    4.57  )-----------( 145      ( 27 Mar 2022 06:55 )             31.8     [id number]    141  |  107  |  14  ----------------------------<  83  4.2   |  24  |  0.81    Ca    8.6      27 Mar 2022 06:54  Phos  3.1     03-27  Mg     1.9     03-27    Radiology:  -03/25 CTH/CTV Head: Unremarkable CTH. No evidence of venous sinus thrombosis. MRN-[id number]    Subjective: 50 yo female seen and examined at bedside. No events overnight. States headache is improved today.    PAST MEDICAL & SURGICAL HISTORY:  Migraines    No significant past surgical history    S/P tubal ligation    Social Hx:  Nonsmoker, no drug or alcohol use    MEDICATIONS  (STANDING):  enoxaparin Injectable 40 milliGRAM(s) SubCutaneous every 24 hours  escitalopram 5 milliGRAM(s) Oral daily    MEDICATIONS  (PRN):  diphenhydrAMINE Injectable 25 milliGRAM(s) IV Push every 8 hours PRN Headache  ketorolac   Injectable 30 milliGRAM(s) IV Push every 8 hours PRN Headache  metoclopramide Injectable 10 milliGRAM(s) IV Push every 8 hours PRN Headache    Allergies  cinnamon (Unknown)  penicillin (Hives)    ROS: Pertinent positives above, all other ROS were reviewed and are negative.      Vital Signs Last 24 Hrs  T(C): 36.5 (28 Mar 2022 05:43), Max: 36.8 (27 Mar 2022 17:54)  T(F): 97.7 (28 Mar 2022 05:43), Max: 98.3 (27 Mar 2022 17:54)  HR: 78 (28 Mar 2022 05:43) (66 - 86)  BP: 108/67 (28 Mar 2022 05:43) (97/61 - 117/73)  RR: 18 (28 Mar 2022 05:43) (18 - 18)  SpO2: 99% (28 Mar 2022 05:43) (97% - 99%)    GENERAL EXAM:  Constitutional: Lying in bed, NAD.  Head: Normocephalic, atraumatic.  Extremities: No edema.    NEUROLOGICAL EXAM:  MS: Sleeping, eyes open to voice. Speech is fluent, not slurred. Follows commands.  CN: EOMI. Face symmetric.  Motor: Moves all extremities.  Sensory: Intact to LT throughout.  Coordination/Gait: Not assessed.    Labs:   cbc                      9.8    4.57  )-----------( 145      ( 27 Mar 2022 06:55 )             31.8     [id number]    141  |  107  |  14  ----------------------------<  83  4.2   |  24  |  0.81    Ca    8.6      27 Mar 2022 06:54  Phos  3.1     03-27  Mg     1.9     03-27    Radiology:  -03/25 CTH/CTV Head: Unremarkable CTH. No evidence of venous sinus thrombosis. MRN-[id number]    Subjective: 52 yo female seen and examined at bedside. No events overnight. States headache is improved today.    PAST MEDICAL & SURGICAL HISTORY:  Migraines    No significant past surgical history    S/P tubal ligation    Social Hx:  Nonsmoker, no drug or alcohol use    FHx: Non-contributory    MEDICATIONS  (STANDING):  enoxaparin Injectable 40 milliGRAM(s) SubCutaneous every 24 hours  escitalopram 5 milliGRAM(s) Oral daily    MEDICATIONS  (PRN):  diphenhydrAMINE Injectable 25 milliGRAM(s) IV Push every 8 hours PRN Headache  ketorolac   Injectable 30 milliGRAM(s) IV Push every 8 hours PRN Headache  metoclopramide Injectable 10 milliGRAM(s) IV Push every 8 hours PRN Headache    Allergies  cinnamon (Unknown)  penicillin (Hives)    ROS: Pertinent positives above, all other ROS were reviewed and are negative.      Vital Signs Last 24 Hrs  T(C): 36.5 (28 Mar 2022 05:43), Max: 36.8 (27 Mar 2022 17:54)  T(F): 97.7 (28 Mar 2022 05:43), Max: 98.3 (27 Mar 2022 17:54)  HR: 78 (28 Mar 2022 05:43) (66 - 86)  BP: 108/67 (28 Mar 2022 05:43) (97/61 - 117/73)  RR: 18 (28 Mar 2022 05:43) (18 - 18)  SpO2: 99% (28 Mar 2022 05:43) (97% - 99%)    GENERAL EXAM:  Constitutional: Lying in bed, NAD.  Head: Normocephalic, atraumatic.  Extremities: No edema.    NEUROLOGICAL EXAM:  MS: Sleeping, eyes open to voice. Speech is fluent, not slurred. Follows commands.  CN: EOMI. Face symmetric.  Motor: Moves all extremities.  Sensory: Intact to LT throughout.  Coordination/Gait: Not assessed.    Labs:   cbc                      9.8    4.57  )-----------( 145      ( 27 Mar 2022 06:55 )             31.8     [id number]    141  |  107  |  14  ----------------------------<  83  4.2   |  24  |  0.81    Ca    8.6      27 Mar 2022 06:54  Phos  3.1     03-27  Mg     1.9     03-27    Radiology:  -03/25 CTH/CTV Head: Unremarkable CTH. No evidence of venous sinus thrombosis.

## 2022-04-28 NOTE — ED PROVIDER NOTE - NSTIMEPROVIDERCAREINITIATE_GEN_ER
From: Sanya Peng  To: Tarun Horton  Sent: 4/27/2022 9:40 PM CDT  Subject: Re-fill of Medication    Hello Dr. Horton,    I am in need of a refill of my vyvanse. It just recently ran out.    Thanks,  Sanya   04-May-2018 20:23

## 2022-06-20 NOTE — ED ADULT NURSE NOTE - OBJECTIVE STATEMENT
Swab grew staph  Start doxy, rx sent, please  patient on sun protection w Doxy 50 yo female from home A&OX4 ambulatory on arrival c/o migraine x 4 days. Patient has hx of migraine, states Excedrin is not working. Patient c/o nausea, vomiting, headache, photophobia. VS stable.

## 2023-07-29 NOTE — ED ADULT NURSE NOTE - CCCP TRG CHIEF CMPLNT
Pt presents with daily nose bleeds x 1 week. Early am today at 3a pt reports nosebleed lasting 5 minutes. Pt reports mostly from left nare. migraine

## 2024-09-19 NOTE — ED PROVIDER NOTE - CROS ED NEURO POS
September 19, 2024     Patient: Shun Zhang   YOB: 2011   Date of Visit: 9/19/2024       To Whom it May Concern:    Shun Zhang was seen in my clinic on 9/19/2024 at 10:40 am.    Please excuse Anjali Maxwell for her absence from work on the date listed above to be able to make her appointment.  Anjali can return back to work on 9/20/2024.    Sincerely,         Olivia Zimmerman MD    Medical information is confidential and cannot be disclosed without the written consent of the patient or her representative.       [facility]

## 2025-03-10 NOTE — ED PROVIDER NOTE - CLINICAL SUMMARY MEDICAL DECISION MAKING FREE TEXT BOX
Physical Therapy Visit    Visit Type: Daily Treatment Note  Visit: 9  Referring Provider: Austin Simon MD  Medical Diagnosis (from order): S42.91XD - Fracture of right shoulder girdle with routine healing     SUBJECTIVE                                                                                                               Patient reports she saw physician and is to suspend physical therapy. She continues to experience constant soreness      OBJECTIVE                                                                                                                                         Treatment     Therapeutic Exercise  Pulleys flexion/abduction 3 minutes  Passive range of motion range of motion  shoulder 5 minutes  Arm bike 2 minutes forward/2 minutes backward, level 2 resistance  Doorway pectoralis major stretch 3x30 seconds   Shoulder flexion wall wash 2x10        Manual Therapy   Soft tissue mobilization to right shoulder    Neuromuscular Re-Education  Speed pulley row 35 pounds  2x10  Speed pulley shoulder extension 30 pounds  2x10  Speed pulley horizontal abduction 15 pounds 2x10  Speed pulley external rotation 5 pounds 2x10        Skilled input: verbal instruction/cues and tactile instruction/cues    Writer verbally educated and received verbal consent for hand placement, positioning of patient, and techniques to be performed today from patient for clothing adjustments for techniques and therapist position for techniques as described above and how they are pertinent to the patient's plan of care.  Home Exercise Program  Patient issued written home exercise program. Patient was offered home exercise program to be given via text message and e-mail with online access providing exercise description, videos, and pictures to assist. Patient encouraged to hold on any exercises if unsure how to complete correctly and to follow up next visit with further questions. Patient demonstrated understanding of all  exercises and was given all materials necessary to complete it independently.  Access Code: [email or web address]  URL: [email or web address]  Date: 02/11/2025  Prepared by: Arnulfo Rueda    Exercises  - Seated Shoulder Flexion AAROM with Pulley Behind  - 1-2 x daily - 7 x weekly - 5 minutes  - Seated Shoulder Abduction AAROM with Pulley Behind  - 1-2 x daily - 7 x weekly - 5 minutes  - Shoulder Flexion Wall Slide with Towel  - 1-2 x daily - 7 x weekly - 3 sets - 10 reps  - Standing Shoulder Row with Anchored Resistance  - 1 x daily - 7 x weekly - 3 sets - 10 reps  - Shoulder extension with resistance - Neutral  - 1 x daily - 7 x weekly - 3 sets - 10 reps      ASSESSMENT                                                                                                            Patient presenting with good tolerance to shoulder strengthening exercises with 2-3/10 pain during and after exercises. She demonstrates pain with long lever armed over head movements. We continued to work on periscapular and rotator cuff strengthening with ongoing soreness and pain with external rotation. Plan to reassess next visit.   Education:   - Results of above outlined education: Verbalizes understanding and Demonstrates understanding    PLAN                                                                                                                           Suggestions for next session as indicated: Continue plan of care to address restrictions identified in initial evaluation contributing to functional deficits including plan of care: activities of daily living, biomechanical training, home program, manual therapy, neuromuscular re-education, therapeutic activities and therapeutic exercise.          Therapy procedure time and total treatment time can be found documented on the Time Entry flowsheet     Rg PGY-2:  48yo F here with sudden onset headache maximal in onset followed by vomiting, likely migraine as pulsitile unilateral with photophobia vs less likely intracranial bleed, doubt infectious etiology no F/C no neck stiffness, will obtain CT, meds for pain control and reassess